# Patient Record
Sex: FEMALE | Race: WHITE | NOT HISPANIC OR LATINO | Employment: OTHER | URBAN - METROPOLITAN AREA
[De-identification: names, ages, dates, MRNs, and addresses within clinical notes are randomized per-mention and may not be internally consistent; named-entity substitution may affect disease eponyms.]

---

## 2017-01-03 ENCOUNTER — RESULTS ENCOUNTER (OUTPATIENT)
Dept: ENDOCRINOLOGY | Age: 72
End: 2017-01-03

## 2017-01-03 DIAGNOSIS — E79.0 HYPERURICEMIA: ICD-10-CM

## 2017-01-03 DIAGNOSIS — E55.9 VITAMIN D DEFICIENCY: ICD-10-CM

## 2017-01-03 DIAGNOSIS — IMO0002 UNCONTROLLED TYPE 2 DIABETES MELLITUS: ICD-10-CM

## 2017-01-03 DIAGNOSIS — I10 ESSENTIAL HYPERTENSION: ICD-10-CM

## 2017-01-03 DIAGNOSIS — E78.5 DYSLIPIDEMIA: ICD-10-CM

## 2017-01-16 ENCOUNTER — LAB (OUTPATIENT)
Dept: ENDOCRINOLOGY | Age: 72
End: 2017-01-16

## 2017-01-16 DIAGNOSIS — E79.0 HYPERURICEMIA: ICD-10-CM

## 2017-01-16 DIAGNOSIS — E78.5 DYSLIPIDEMIA: ICD-10-CM

## 2017-01-16 DIAGNOSIS — IMO0002 UNCONTROLLED TYPE 2 DIABETES MELLITUS: ICD-10-CM

## 2017-01-16 DIAGNOSIS — E55.9 VITAMIN D DEFICIENCY: ICD-10-CM

## 2017-01-16 DIAGNOSIS — I10 ESSENTIAL HYPERTENSION: ICD-10-CM

## 2017-01-17 LAB
25(OH)D3+25(OH)D2 SERPL-MCNC: 47.2 NG/ML (ref 30–100)
ALBUMIN SERPL-MCNC: 4 G/DL (ref 3.5–5.2)
ALBUMIN/GLOB SERPL: 1.6 G/DL
ALP SERPL-CCNC: 77 U/L (ref 39–117)
ALT SERPL-CCNC: 14 U/L (ref 1–33)
AST SERPL-CCNC: 22 U/L (ref 1–32)
BILIRUB SERPL-MCNC: 0.7 MG/DL (ref 0.1–1.2)
BUN SERPL-MCNC: 21 MG/DL (ref 8–23)
BUN/CREAT SERPL: 14.6 (ref 7–25)
C PEPTIDE SERPL-MCNC: 1.7 NG/ML (ref 1.1–4.4)
CALCIUM SERPL-MCNC: 9.5 MG/DL (ref 8.6–10.5)
CHLORIDE SERPL-SCNC: 102 MMOL/L (ref 98–107)
CHOLEST SERPL-MCNC: 181 MG/DL (ref 0–200)
CO2 SERPL-SCNC: 30.3 MMOL/L (ref 22–29)
CREAT SERPL-MCNC: 1.44 MG/DL (ref 0.57–1)
GLOBULIN SER CALC-MCNC: 2.5 GM/DL
GLUCOSE SERPL-MCNC: 85 MG/DL (ref 65–99)
HBA1C MFR BLD: 6.8 % (ref 4.8–5.6)
HDLC SERPL-MCNC: 52 MG/DL (ref 40–60)
LDLC SERPL CALC-MCNC: 101 MG/DL (ref 0–100)
MICROALBUMIN UR-MCNC: 20 UG/ML
POTASSIUM SERPL-SCNC: 5.2 MMOL/L (ref 3.5–5.2)
PROT SERPL-MCNC: 6.5 G/DL (ref 6–8.5)
SODIUM SERPL-SCNC: 145 MMOL/L (ref 136–145)
T4 SERPL-MCNC: 7.4 MCG/DL (ref 4.5–11.7)
TRIGL SERPL-MCNC: 138 MG/DL (ref 0–150)
TSH SERPL DL<=0.005 MIU/L-ACNC: 3.49 MIU/ML (ref 0.27–4.2)
URATE SERPL-MCNC: 1.8 MG/DL (ref 2.4–5.7)
VLDLC SERPL CALC-MCNC: 27.6 MG/DL (ref 5–40)

## 2017-01-23 RX ORDER — FEBUXOSTAT 80 MG/1
TABLET ORAL
Qty: 90 TABLET | Refills: 0 | Status: SHIPPED | OUTPATIENT
Start: 2017-01-23 | End: 2017-01-30 | Stop reason: SDUPTHER

## 2017-01-30 ENCOUNTER — OFFICE VISIT (OUTPATIENT)
Dept: ENDOCRINOLOGY | Age: 72
End: 2017-01-30

## 2017-01-30 VITALS
HEIGHT: 64 IN | SYSTOLIC BLOOD PRESSURE: 130 MMHG | WEIGHT: 228.2 LBS | BODY MASS INDEX: 38.96 KG/M2 | DIASTOLIC BLOOD PRESSURE: 80 MMHG

## 2017-01-30 DIAGNOSIS — E55.9 VITAMIN D DEFICIENCY: ICD-10-CM

## 2017-01-30 DIAGNOSIS — E66.9 CLASS 1 OBESITY: ICD-10-CM

## 2017-01-30 DIAGNOSIS — E11.9 TYPE 2 DIABETES MELLITUS WITHOUT COMPLICATION, UNSPECIFIED LONG TERM INSULIN USE STATUS: Primary | ICD-10-CM

## 2017-01-30 DIAGNOSIS — N28.9 RENAL INSUFFICIENCY: ICD-10-CM

## 2017-01-30 DIAGNOSIS — E78.5 DYSLIPIDEMIA: ICD-10-CM

## 2017-01-30 DIAGNOSIS — I10 ESSENTIAL HYPERTENSION: ICD-10-CM

## 2017-01-30 PROBLEM — N18.30 CHRONIC KIDNEY DISEASE, STAGE III (MODERATE): Status: ACTIVE | Noted: 2017-01-30

## 2017-01-30 PROCEDURE — 99214 OFFICE O/P EST MOD 30 MIN: CPT | Performed by: NURSE PRACTITIONER

## 2017-01-30 RX ORDER — SITAGLIPTIN 50 MG/1
50 TABLET, FILM COATED ORAL DAILY
Qty: 30 TABLET | Refills: 5
Start: 2017-01-30 | End: 2017-02-16 | Stop reason: SDUPTHER

## 2017-01-30 NOTE — PROGRESS NOTES
"Subjective   Rachel Albrecht is a 72 y.o. female is here today for follow-up.  Chief Complaint   Patient presents with   • Diabetes     recent labs, BG log book, testing BG one time daily   • Hypertension     patient is having a hard time losing weight   • hyperuricemia   • Vitamin D Deficiency     Visit Vitals   • /80   • Ht 64\" (162.6 cm)   • Wt 228 lb 3.2 oz (104 kg)   • BMI 39.17 kg/m2       Current Outpatient Prescriptions:   •  B-D ULTRAFINE III SHORT PEN 31G X 8 MM misc, USE TO INJECT INSULIN DAILY, Disp: 100 each, Rfl: 3  •  Cholecalciferol (VITAMIN D3) 2000 UNITS tablet, Take 2 tablets by mouth daily., Disp: , Rfl:   •  febuxostat (ULORIC) 80 MG tablet tablet, Take 80 mg by mouth daily., Disp: , Rfl:   •  JANUVIA 50 MG tablet, Take 50 mg by mouth daily., Disp: , Rfl:   •  LEVEMIR FLEXPEN 100 UNIT/ML injection, Inject 60 Units under the skin daily., Disp: , Rfl:   •  metoprolol tartrate (LOPRESSOR) 50 MG tablet, TAKE 1 TABLET DAILY, Disp: 90 tablet, Rfl: 2  •  Multiple Vitamins-Minerals (PRESERVISION AREDS PO), Take 1 tablet by mouth daily., Disp: , Rfl:   •  ONE TOUCH ULTRA TEST test strip, TEST BLOOD SUGAR TWO TIMES A DAY, Disp: 200 each, Rfl: 0  •  pioglitazone (ACTOS) 45 MG tablet, Take 1 tablet by mouth daily., Disp: 90 tablet, Rfl: 3  •  simvastatin (ZOCOR) 20 MG tablet, TAKE 1 TABLET DAILY, Disp: 90 tablet, Rfl: 2  No Known Allergies  Social History     Social History   • Marital status:      Spouse name: N/A   • Number of children: N/A   • Years of education: N/A     Social History Main Topics   • Smoking status: Never Smoker   • Smokeless tobacco: None   • Alcohol use No   • Drug use: None   • Sexual activity: Not Asked     Other Topics Concern   • None     Social History Narrative     Family History   Problem Relation Age of Onset   • Diabetes Father    • Kidney disease Father    • Heart attack Father    • Diabetes Brother    • Kidney disease Brother      No Known " "Allergies      History of Present Illness   Encounter Diagnoses   Name Primary?   • Essential hypertension    • Vitamin D deficiency    • Class 1 obesity    • Type 2 diabetes mellitus without complication, unspecified long term insulin use status Yes   • Renal insufficiency    • Dyslipidemia        Patient is a 72-year-old white female who is seen today for a four-month follow-up above-mentioned problems.  Recent labs which were reviewed and discussed with patient she was provided a copy.  Patient states she takes all medications as prescribed. Brought in blood sugar log which was reviewed with blood sugars averaging between 90s-130s.  According to her most recent A1c her diabetes is well controlled. She states she has had occasional episodes of low blood sugar in which she feels sweaty and \"can't think\" however, she does not check her blood sugar when these episodes occur. Most Recent episode occurred last week when she hadn't eaten in a while and also states that low blood sugar signs and symptoms only occur when she hasn't eaten in a while.  She states that when she eats symptoms improve. Patient states she is trying to lose weight and wants to discuss dieting.  She states she has lost 4 pounds but unfortunately has gained it back. She is not currently exercising but has used weight watchers in the past with good results and is considering going back. We discussed the possibility of adding GLP-1 to current medications and stopping januvia, however she refused any med changes at this time, as she feels she is doing well and prefers to try dieting first.  Her recent labs her vitamin D level and cholesterol is within normal limits.  Her blood pressure is stable at today's visit.    The following portions of the patient's history were reviewed and updated as appropriate: allergies, current medications, past family history, past medical history, past social history, past surgical history and problem list.    Review of " Systems   Constitutional: Negative for appetite change.   HENT: Negative for facial swelling.    Eyes: Negative for pain.   Respiratory: Negative for cough.    Cardiovascular: Negative for leg swelling.   Gastrointestinal: Negative for anal bleeding.   Endocrine: Negative for heat intolerance.   Genitourinary: Negative for menstrual problem.   Musculoskeletal: Negative for joint swelling.   Skin: Negative for color change.   Allergic/Immunologic: Negative for food allergies.   Neurological: Negative for seizures.   Hematological: Does not bruise/bleed easily.   Psychiatric/Behavioral: Negative for hallucinations.       Objective   Physical Exam   Constitutional: She is oriented to person, place, and time. She appears well-developed and well-nourished. No distress.   HENT:   Head: Normocephalic and atraumatic.   Right Ear: External ear normal.   Left Ear: External ear normal.   Nose: Nose normal.   Mouth/Throat: Oropharynx is clear and moist. No oropharyngeal exudate.   Eyes: EOM are normal. Pupils are equal, round, and reactive to light. Right eye exhibits no discharge. Left eye exhibits no discharge.   Neck: Trachea normal, normal range of motion and full passive range of motion without pain. Neck supple. No tracheal tenderness present. Carotid bruit is not present. No tracheal deviation, no edema and no erythema present. No thyroid mass and no thyromegaly present.   Cardiovascular: Normal rate, regular rhythm, normal heart sounds and intact distal pulses.  Exam reveals no gallop and no friction rub.    No murmur heard.  Pulmonary/Chest: Effort normal and breath sounds normal. No stridor. No respiratory distress. She has no wheezes. She has no rales.   Abdominal: Soft. Bowel sounds are normal. She exhibits no distension.   Musculoskeletal: Normal range of motion. She exhibits edema. She exhibits no deformity.   1 + pitting BLE   Lymphadenopathy:     She has no cervical adenopathy.   Neurological: She is alert and  oriented to person, place, and time.   Skin: Skin is warm and dry. No rash noted. She is not diaphoretic. No erythema. No pallor.   Psychiatric: She has a normal mood and affect. Her behavior is normal. Judgment and thought content normal.   Nursing note and vitals reviewed.    Results for orders placed or performed in visit on 01/03/17   T4 and TSH ([facility] Only)   Result Value Ref Range    TSH 3.490 0.270 - 4.200 mIU/mL    T4, Total 7.40 4.50 - 11.70 mcg/dL   Comprehensive metabolic panel   Result Value Ref Range    Glucose 85 65 - 99 mg/dL    BUN 21 8 - 23 mg/dL    Creatinine 1.44 (H) 0.57 - 1.00 mg/dL    eGFR Non African Am 36 (L) >60 mL/min/1.73    eGFR African Am 43 (L) >60 mL/min/1.73    BUN/Creatinine Ratio 14.6 7.0 - 25.0    Sodium 145 136 - 145 mmol/L    Potassium 5.2 3.5 - 5.2 mmol/L    Chloride 102 98 - 107 mmol/L    Total CO2 30.3 (H) 22.0 - 29.0 mmol/L    Calcium 9.5 8.6 - 10.5 mg/dL    Total Protein 6.5 6.0 - 8.5 g/dL    Albumin 4.00 3.50 - 5.20 g/dL    Globulin 2.5 gm/dL    A/G Ratio 1.6 g/dL    Total Bilirubin 0.7 0.1 - 1.2 mg/dL    Alkaline Phosphatase 77 39 - 117 U/L    AST (SGOT) 22 1 - 32 U/L    ALT (SGPT) 14 1 - 33 U/L   C-peptide   Result Value Ref Range    C-Peptide 1.7 1.1 - 4.4 ng/mL   Hemoglobin A1c   Result Value Ref Range    Hemoglobin A1C 6.80 (H) 4.80 - 5.60 %   Lipid panel   Result Value Ref Range    Total Cholesterol 181 0 - 200 mg/dL    Triglycerides 138 0 - 150 mg/dL    HDL Cholesterol 52 40 - 60 mg/dL    VLDL Cholesterol 27.6 5 - 40 mg/dL    LDL Cholesterol  101 (H) 0 - 100 mg/dL   Vitamin D 25 hydroxy   Result Value Ref Range    25 Hydroxy, Vitamin D 47.2 30.0 - 100.0 ng/mL   Uric acid   Result Value Ref Range    Uric Acid 1.8 (L) 2.4 - 5.7 mg/dL   MicroAlbumin, Urine, Random   Result Value Ref Range    Microalbumin, Urine 20.0 Not Estab. ug/mL           Assessment/Plan   Rachel was seen today for diabetes, hypertension, hyperuricemia and vitamin d deficiency.    Diagnoses and all  orders for this visit:    Type 2 diabetes mellitus without complication, unspecified long term insulin use status    Essential hypertension    Vitamin D deficiency    Class 1 obesity    Renal insufficiency    Dyslipidemia    In summary, the patient was examined and given a hard copy and all laboratory results.  No changes in current medications. She will continue all her current meds.  Patient is encouraged to begin weight watchers in order to help promote weight loss.  Symptoms of low blood sugar were reviewed with patient she states understanding.  She was advised that if she feels like she is having a low blood sugar to check her blood sugar.  I have instructed patient to call office with any questions or concerns and to follow-up in 4 months with labs prior to appointment.

## 2017-01-30 NOTE — MR AVS SNAPSHOT
Rachel Albrecht   1/30/2017 1:40 PM   Office Visit    Dept Phone:  [phone]   Encounter #:  [id number]    Provider:  ALECIA Sims   Department:  [facility] ENDO[facility]OLOG[facility]                Your Full Care Plan              Today's Medication Changes          These changes are accurate as of: 1/30/17  2:24 PM.  If you have any questions, ask your nurse or doctor.               Medication(s)that have changed:     febuxostat 80 MG tablet tablet   Commonly known as:  ULORIC   Take 80 mg by mouth daily.   What changed:  Another medication with the same name was removed. Continue taking this medication, and follow the directions you see here.   Changed by:  ALECIA Sims            Where to Get Your Medications      Information about where to get these medications is not yet available     ! Ask your nurse or doctor about these medications     JANUVIA 50 MG tablet                  Your Updated Medication List          This list is accurate as of: 1/30/17  2:24 PM.  Always use your most recent med list.                B-D ULTRAFINE III SHORT PEN 31G X 8 MM misc   Generic drug:  Insulin Pen Needle   USE TO INJECT INSULIN DAILY       febuxostat 80 MG tablet tablet   Commonly known as:  ULORIC       JANUVIA 50 MG tablet   Generic drug:  SITagliptin   Take 1 tablet by mouth Daily.       LEVEMIR FLEXPEN 100 UNIT/ML injection   Generic drug:  insulin detemir       metoprolol tartrate 50 MG tablet   Commonly known as:  LOPRESSOR   TAKE 1 TABLET DAILY       ONE TOUCH ULTRA TEST test strip   Generic drug:  glucose blood   TEST BLOOD SUGAR TWO TIMES A DAY       pioglitazone 45 MG tablet   Commonly known as:  ACTOS   Take 1 tablet by mouth daily.       PRESERVISION AREDS PO       simvastatin 20 MG tablet   Commonly known as:  ZOCOR   TAKE 1 TABLET DAILY       Vitamin D3 2000 UNITS tablet               You Were Diagnosed With        Codes Comments    Type 2 diabetes mellitus  without complication, unspecified long term insulin use status    -  Primary ICD-10-CM: E11.9  ICD-9-CM: 250.00     Essential hypertension     ICD-10-CM: I10  ICD-9-CM: 401.9     Vitamin D deficiency     ICD-10-CM: E55.9  ICD-9-CM: 268.9     Class 1 obesity     ICD-10-CM: E66.9  ICD-9-CM: 278.00     Renal insufficiency     ICD-10-CM: N28.9  ICD-9-CM: 593.9     Dyslipidemia     ICD-10-CM: E78.5  ICD-9-CM: 272.4       Instructions    Continue all current medications   Sign up to weight watchers     Patient Instructions History      Upcoming Appointments     Visit Type Date Time Department    OFFICE VISIT 2017  1:40 PM [facility]    [facility] 2017  1:00 PM [facility]    OFFICE VISIT 2017  1:40 PM [facility]      [employer] Signup     [facility] [employer] allows you to send messages to your doctor, view your test results, renew your prescriptions, schedule appointments, and more. To sign up, go to [employer] and click on the Sign Up Now link in the New User? box. Enter your [employer] Activation Code exactly as it appears below along with the last four digits of your Social Security Number and your Date of Birth () to complete the sign-up process. If you do not sign up before the expiration date, you must request a new code.    [employer] Activation Code: [email or web address]  Expires: 2017  2:22 PM    If you have questions, you can email [email or web address] or call [phone] to talk to our [employer] staff. Remember, [employer] is NOT to be used for urgent needs. For medical emergencies, dial 911.               Other Info from Your Visit           Your Appointments     May 16, 2017  1:00 PM EDT   [facility], [facility]   [facility] (--)    [address] [facility]. [address] [place] [id number]   [phone]            May 30, 2017  1:40 PM EDT   Office Visit with Marilin Bird MD   [facility]  "ENDOCRINOLOGY (--)    [address] [facility]. [address] [place] [id number] [phone]           Arrive 15 minutes prior to appointment.              Allergies     No Known Allergies      Reason for Visit     Diabetes recent labs, BG log book, testing BG one time daily    Hypertension patient is having a hard time losing weight    hyperuricemia     Vitamin D Deficiency           Vital Signs     Blood Pressure Height Weight Body Mass Index Smoking Status       130/80 64\" (162.6 cm) 228 lb 3.2 oz (104 kg) 39.17 kg/m2 Never Smoker       Problems and Diagnoses Noted     Chronic kidney disease, stage III (moderate)    Obesity    Dyslipidemia    High blood pressure    Elevated blood uric acid level    Knee pain    Macular degeneration    Degenerative arthritis of knee    Bone disease    Pulmonary hypertension    Poor kidney function    Sleep apnea    Type 2 diabetes    Vitamin D deficiency        "

## 2017-02-16 ENCOUNTER — TELEPHONE (OUTPATIENT)
Dept: ENDOCRINOLOGY | Age: 72
End: 2017-02-16

## 2017-02-16 DIAGNOSIS — E78.5 DYSLIPIDEMIA: ICD-10-CM

## 2017-02-16 DIAGNOSIS — E55.9 VITAMIN D DEFICIENCY: ICD-10-CM

## 2017-02-16 DIAGNOSIS — N28.9 RENAL INSUFFICIENCY: ICD-10-CM

## 2017-02-16 DIAGNOSIS — IMO0002 UNCONTROLLED TYPE 2 DIABETES MELLITUS: ICD-10-CM

## 2017-02-16 RX ORDER — INSULIN DETEMIR 100 [IU]/ML
INJECTION, SOLUTION SUBCUTANEOUS
Qty: 60 ML | Refills: 2 | Status: SHIPPED | OUTPATIENT
Start: 2017-02-16 | End: 2017-12-18 | Stop reason: SDUPTHER

## 2017-02-16 RX ORDER — SIMVASTATIN 20 MG
TABLET ORAL
Qty: 90 TABLET | Refills: 1 | Status: SHIPPED | OUTPATIENT
Start: 2017-02-16 | End: 2017-08-22 | Stop reason: SDUPTHER

## 2017-02-16 RX ORDER — SITAGLIPTIN 50 MG/1
TABLET, FILM COATED ORAL
Qty: 90 TABLET | Refills: 2 | Status: SHIPPED | OUTPATIENT
Start: 2017-02-16 | End: 2017-11-21 | Stop reason: SDUPTHER

## 2017-02-16 NOTE — TELEPHONE ENCOUNTER
I left patient a message for her to call back pertaining to her BG readings.  Komal states that BG is in good range. She advised pt to cut back to 50 units of Lantus if she becomes symptomatic at BG reading of 111

## 2017-03-28 DIAGNOSIS — E55.9 VITAMIN D DEFICIENCY: ICD-10-CM

## 2017-03-28 DIAGNOSIS — N28.9 RENAL INSUFFICIENCY: ICD-10-CM

## 2017-03-28 DIAGNOSIS — E78.5 DYSLIPIDEMIA: ICD-10-CM

## 2017-03-28 DIAGNOSIS — IMO0002 UNCONTROLLED TYPE 2 DIABETES MELLITUS: ICD-10-CM

## 2017-03-28 RX ORDER — PIOGLITAZONEHYDROCHLORIDE 45 MG/1
TABLET ORAL
Qty: 90 TABLET | Refills: 2 | Status: SHIPPED | OUTPATIENT
Start: 2017-03-28 | End: 2017-12-29 | Stop reason: SDUPTHER

## 2017-03-28 RX ORDER — METOPROLOL TARTRATE 50 MG/1
TABLET, FILM COATED ORAL
Qty: 90 TABLET | Refills: 1 | Status: SHIPPED | OUTPATIENT
Start: 2017-03-28 | End: 2017-10-09 | Stop reason: SDUPTHER

## 2017-04-24 DIAGNOSIS — N28.9 RENAL INSUFFICIENCY: ICD-10-CM

## 2017-04-24 DIAGNOSIS — IMO0002 UNCONTROLLED TYPE 2 DIABETES MELLITUS: ICD-10-CM

## 2017-04-24 DIAGNOSIS — E55.9 VITAMIN D DEFICIENCY: ICD-10-CM

## 2017-04-24 DIAGNOSIS — E78.5 DYSLIPIDEMIA: ICD-10-CM

## 2017-04-24 RX ORDER — FEBUXOSTAT 80 MG/1
TABLET ORAL
Qty: 90 TABLET | Refills: 1 | Status: SHIPPED | OUTPATIENT
Start: 2017-04-24 | End: 2017-10-25 | Stop reason: SDUPTHER

## 2017-05-15 DIAGNOSIS — E79.0 HYPERURICEMIA: ICD-10-CM

## 2017-05-15 DIAGNOSIS — IMO0002 UNCONTROLLED TYPE 2 DIABETES MELLITUS WITH OTHER SPECIFIED COMPLICATION: Primary | ICD-10-CM

## 2017-05-15 DIAGNOSIS — E55.9 VITAMIN D DEFICIENCY: Primary | ICD-10-CM

## 2017-05-15 DIAGNOSIS — E78.5 DYSLIPIDEMIA: ICD-10-CM

## 2017-05-16 ENCOUNTER — LAB (OUTPATIENT)
Dept: ENDOCRINOLOGY | Age: 72
End: 2017-05-16

## 2017-05-16 DIAGNOSIS — E55.9 VITAMIN D DEFICIENCY: ICD-10-CM

## 2017-05-16 DIAGNOSIS — E79.0 HYPERURICEMIA: ICD-10-CM

## 2017-05-16 DIAGNOSIS — IMO0002 UNCONTROLLED TYPE 2 DIABETES MELLITUS WITH OTHER SPECIFIED COMPLICATION: ICD-10-CM

## 2017-05-16 DIAGNOSIS — E78.5 DYSLIPIDEMIA: ICD-10-CM

## 2017-05-17 LAB
25(OH)D3+25(OH)D2 SERPL-MCNC: 50.3 NG/ML (ref 30–100)
ALBUMIN SERPL-MCNC: 3.9 G/DL (ref 3.5–5.2)
ALBUMIN/GLOB SERPL: 1.5 G/DL
ALP SERPL-CCNC: 73 U/L (ref 39–117)
ALT SERPL-CCNC: 12 U/L (ref 1–33)
AST SERPL-CCNC: 21 U/L (ref 1–32)
BILIRUB SERPL-MCNC: 0.6 MG/DL (ref 0.1–1.2)
BUN SERPL-MCNC: 27 MG/DL (ref 8–23)
BUN/CREAT SERPL: 17.5 (ref 7–25)
C PEPTIDE SERPL-MCNC: 3.5 NG/ML (ref 1.1–4.4)
CALCIUM SERPL-MCNC: 9.2 MG/DL (ref 8.6–10.5)
CHLORIDE SERPL-SCNC: 100 MMOL/L (ref 98–107)
CHOLEST SERPL-MCNC: 194 MG/DL (ref 0–200)
CO2 SERPL-SCNC: 29 MMOL/L (ref 22–29)
CREAT SERPL-MCNC: 1.54 MG/DL (ref 0.57–1)
GLOBULIN SER CALC-MCNC: 2.6 GM/DL
GLUCOSE SERPL-MCNC: 203 MG/DL (ref 65–99)
HBA1C MFR BLD: 7.2 % (ref 4.8–5.6)
HDLC SERPL-MCNC: 48 MG/DL (ref 40–60)
LDLC SERPL CALC-MCNC: 120 MG/DL (ref 0–100)
POTASSIUM SERPL-SCNC: 4.6 MMOL/L (ref 3.5–5.2)
PROT SERPL-MCNC: 6.5 G/DL (ref 6–8.5)
SODIUM SERPL-SCNC: 140 MMOL/L (ref 136–145)
T4 SERPL-MCNC: 6.56 MCG/DL (ref 4.5–11.7)
TRIGL SERPL-MCNC: 132 MG/DL (ref 0–150)
TSH SERPL DL<=0.005 MIU/L-ACNC: 3.41 MIU/ML (ref 0.27–4.2)
UNABLE TO VOID: NORMAL
URATE SERPL-MCNC: 1.7 MG/DL (ref 2.4–5.7)
VLDLC SERPL CALC-MCNC: 26.4 MG/DL (ref 5–40)

## 2017-05-30 ENCOUNTER — OFFICE VISIT (OUTPATIENT)
Dept: ENDOCRINOLOGY | Age: 72
End: 2017-05-30

## 2017-05-30 VITALS
BODY MASS INDEX: 38.93 KG/M2 | SYSTOLIC BLOOD PRESSURE: 130 MMHG | RESPIRATION RATE: 16 BRPM | HEIGHT: 64 IN | DIASTOLIC BLOOD PRESSURE: 66 MMHG | WEIGHT: 228 LBS

## 2017-05-30 DIAGNOSIS — IMO0002 UNCONTROLLED TYPE 2 DIABETES MELLITUS WITH OTHER SPECIFIED COMPLICATION: Primary | ICD-10-CM

## 2017-05-30 DIAGNOSIS — I10 ESSENTIAL HYPERTENSION: ICD-10-CM

## 2017-05-30 DIAGNOSIS — E55.9 VITAMIN D DEFICIENCY: ICD-10-CM

## 2017-05-30 DIAGNOSIS — E78.5 DYSLIPIDEMIA: ICD-10-CM

## 2017-05-30 DIAGNOSIS — N28.9 RENAL INSUFFICIENCY: ICD-10-CM

## 2017-05-30 DIAGNOSIS — E79.0 HYPERURICEMIA: ICD-10-CM

## 2017-05-30 PROCEDURE — 99214 OFFICE O/P EST MOD 30 MIN: CPT | Performed by: INTERNAL MEDICINE

## 2017-08-09 RX ORDER — BLOOD-GLUCOSE METER
KIT MISCELLANEOUS
Qty: 1 EACH | Refills: 1 | Status: SHIPPED | OUTPATIENT
Start: 2017-08-09 | End: 2019-04-08 | Stop reason: SDUPTHER

## 2017-08-10 ENCOUNTER — TELEPHONE (OUTPATIENT)
Dept: ENDOCRINOLOGY | Age: 72
End: 2017-08-10

## 2017-08-10 NOTE — TELEPHONE ENCOUNTER
----- Message from Marilin Bird MD sent at 8/9/2017  4:53 PM EDT -----  She can eat cereal, oatmeal and intake and little more of what ever she is eating such as more pieces of toast with butter and used some fruit preserve.  ----- Message -----     From: Lyssa Godoy MA     Sent: 8/9/2017   4:26 PM       To: Marilin Bird MD    She eats toast with peanut butter and yogurt. She would like to know what else she can eat in the mornings to help.   ----- Message -----     From: Marilin Bird MD     Sent: 8/8/2017   5:20 PM       To: Lyssa Godoy MA    Orders she may need to have bigger breakfast.  Because this phenomenon does not happen every day.  Also its a good idea to give her a new glucose meter.  Ask her to stop by to get a new one  ----- Message -----     From: Lyssa Godoy MA     Sent: 8/8/2017  11:56 AM       To: Marilin Bird MD    Patient had hypoglycemic episode on Saturday. She took BG at 630AM and it was 146. She had breakfast but by 10AM she passed out on the floor x 6 hours with low blood sugar. She does not know if her meter is reading wrong or if her medications need to be adjusted.         Spoke to patient 08/10/2017 11:55AM

## 2017-08-22 RX ORDER — SIMVASTATIN 20 MG
TABLET ORAL
Qty: 90 TABLET | Refills: 0 | Status: CANCELLED | OUTPATIENT
Start: 2017-08-22

## 2017-08-22 RX ORDER — SIMVASTATIN 20 MG
20 TABLET ORAL NIGHTLY
Qty: 90 TABLET | Refills: 0 | Status: SHIPPED | OUTPATIENT
Start: 2017-08-22 | End: 2017-11-21 | Stop reason: SDUPTHER

## 2017-09-20 ENCOUNTER — RESULTS ENCOUNTER (OUTPATIENT)
Dept: ENDOCRINOLOGY | Age: 72
End: 2017-09-20

## 2017-09-20 DIAGNOSIS — E78.5 DYSLIPIDEMIA: ICD-10-CM

## 2017-09-20 DIAGNOSIS — IMO0002 UNCONTROLLED TYPE 2 DIABETES MELLITUS WITH OTHER SPECIFIED COMPLICATION: ICD-10-CM

## 2017-09-20 DIAGNOSIS — E55.9 VITAMIN D DEFICIENCY: ICD-10-CM

## 2017-09-20 DIAGNOSIS — I10 ESSENTIAL HYPERTENSION: ICD-10-CM

## 2017-09-20 DIAGNOSIS — E79.0 HYPERURICEMIA: ICD-10-CM

## 2017-09-26 DIAGNOSIS — E11.9 TYPE 2 DIABETES MELLITUS WITHOUT COMPLICATION, UNSPECIFIED LONG TERM INSULIN USE STATUS: Primary | ICD-10-CM

## 2017-09-26 DIAGNOSIS — E79.0 HYPERURICEMIA: ICD-10-CM

## 2017-09-26 DIAGNOSIS — E55.9 VITAMIN D DEFICIENCY: ICD-10-CM

## 2017-10-03 ENCOUNTER — LAB (OUTPATIENT)
Dept: ENDOCRINOLOGY | Age: 72
End: 2017-10-03

## 2017-10-03 DIAGNOSIS — IMO0002 UNCONTROLLED TYPE 2 DIABETES MELLITUS WITH OTHER SPECIFIED COMPLICATION: ICD-10-CM

## 2017-10-03 DIAGNOSIS — E55.9 VITAMIN D DEFICIENCY: ICD-10-CM

## 2017-10-03 DIAGNOSIS — E79.0 HYPERURICEMIA: ICD-10-CM

## 2017-10-03 DIAGNOSIS — E78.5 DYSLIPIDEMIA: ICD-10-CM

## 2017-10-03 DIAGNOSIS — E11.9 TYPE 2 DIABETES MELLITUS WITHOUT COMPLICATION, UNSPECIFIED LONG TERM INSULIN USE STATUS: ICD-10-CM

## 2017-10-03 DIAGNOSIS — I10 ESSENTIAL HYPERTENSION: ICD-10-CM

## 2017-10-04 LAB
25(OH)D3+25(OH)D2 SERPL-MCNC: 57.4 NG/ML (ref 30–100)
ALBUMIN SERPL-MCNC: 3.9 G/DL (ref 3.5–5.2)
ALBUMIN/GLOB SERPL: 1.4 G/DL
ALP SERPL-CCNC: 73 U/L (ref 39–117)
ALT SERPL-CCNC: 12 U/L (ref 1–33)
AST SERPL-CCNC: 21 U/L (ref 1–32)
BILIRUB SERPL-MCNC: 0.8 MG/DL (ref 0.1–1.2)
BUN SERPL-MCNC: 25 MG/DL (ref 8–23)
BUN/CREAT SERPL: 16.1 (ref 7–25)
C PEPTIDE SERPL-MCNC: 3 NG/ML (ref 1.1–4.4)
CALCIUM SERPL-MCNC: 9.7 MG/DL (ref 8.6–10.5)
CHLORIDE SERPL-SCNC: 102 MMOL/L (ref 98–107)
CHOLEST SERPL-MCNC: 177 MG/DL (ref 0–200)
CO2 SERPL-SCNC: 27.2 MMOL/L (ref 22–29)
CREAT SERPL-MCNC: 1.55 MG/DL (ref 0.57–1)
GLOBULIN SER CALC-MCNC: 2.8 GM/DL
GLUCOSE SERPL-MCNC: 160 MG/DL (ref 65–99)
HBA1C MFR BLD: 6.78 % (ref 4.8–5.6)
HDLC SERPL-MCNC: 48 MG/DL (ref 40–60)
LDLC SERPL CALC-MCNC: 106 MG/DL (ref 0–100)
MICROALBUMIN UR-MCNC: 73.3 UG/ML
POTASSIUM SERPL-SCNC: 5.3 MMOL/L (ref 3.5–5.2)
PROT SERPL-MCNC: 6.7 G/DL (ref 6–8.5)
SODIUM SERPL-SCNC: 142 MMOL/L (ref 136–145)
TRIGL SERPL-MCNC: 114 MG/DL (ref 0–150)
URATE SERPL-MCNC: 1.8 MG/DL (ref 2.4–5.7)
VLDLC SERPL CALC-MCNC: 22.8 MG/DL (ref 5–40)

## 2017-10-07 DIAGNOSIS — E78.5 DYSLIPIDEMIA: ICD-10-CM

## 2017-10-07 DIAGNOSIS — IMO0002 UNCONTROLLED TYPE 2 DIABETES MELLITUS: ICD-10-CM

## 2017-10-07 DIAGNOSIS — N28.9 RENAL INSUFFICIENCY: ICD-10-CM

## 2017-10-07 DIAGNOSIS — E55.9 VITAMIN D DEFICIENCY: ICD-10-CM

## 2017-10-07 RX ORDER — METOPROLOL TARTRATE 50 MG/1
TABLET, FILM COATED ORAL
Qty: 90 TABLET | Refills: 1 | Status: CANCELLED | OUTPATIENT
Start: 2017-10-07

## 2017-10-09 DIAGNOSIS — N28.9 RENAL INSUFFICIENCY: ICD-10-CM

## 2017-10-09 DIAGNOSIS — IMO0001 UNCONTROLLED DIABETES MELLITUS TYPE 2 WITHOUT COMPLICATIONS, UNSPECIFIED LONG TERM INSULIN USE STATUS: ICD-10-CM

## 2017-10-09 DIAGNOSIS — E55.9 VITAMIN D DEFICIENCY: ICD-10-CM

## 2017-10-09 DIAGNOSIS — E78.5 DYSLIPIDEMIA: ICD-10-CM

## 2017-10-09 RX ORDER — METOPROLOL TARTRATE 50 MG/1
50 TABLET, FILM COATED ORAL DAILY
Qty: 90 TABLET | Refills: 0 | Status: SHIPPED | OUTPATIENT
Start: 2017-10-09 | End: 2018-01-05 | Stop reason: SDUPTHER

## 2017-10-17 ENCOUNTER — OFFICE VISIT (OUTPATIENT)
Dept: ENDOCRINOLOGY | Age: 72
End: 2017-10-17

## 2017-10-17 VITALS
SYSTOLIC BLOOD PRESSURE: 130 MMHG | WEIGHT: 229 LBS | DIASTOLIC BLOOD PRESSURE: 70 MMHG | HEIGHT: 64 IN | BODY MASS INDEX: 39.09 KG/M2

## 2017-10-17 DIAGNOSIS — E55.9 VITAMIN D DEFICIENCY: ICD-10-CM

## 2017-10-17 DIAGNOSIS — E79.0 HYPERURICEMIA: ICD-10-CM

## 2017-10-17 DIAGNOSIS — E78.5 DYSLIPIDEMIA: ICD-10-CM

## 2017-10-17 DIAGNOSIS — I10 ESSENTIAL HYPERTENSION: ICD-10-CM

## 2017-10-17 DIAGNOSIS — IMO0002 UNCONTROLLED TYPE 2 DIABETES MELLITUS WITH COMPLICATION, WITH LONG-TERM CURRENT USE OF INSULIN: Primary | ICD-10-CM

## 2017-10-17 PROCEDURE — 99214 OFFICE O/P EST MOD 30 MIN: CPT | Performed by: NURSE PRACTITIONER

## 2017-10-17 NOTE — PROGRESS NOTES
"Subjective   Rachel Albrecht is a 72 y.o. female is here today for follow-up.  Chief Complaint   Patient presents with   • Hyperlipidemia     recent labs, testing Bg 1 time daily and did not bring meter   • Hypertension   • Diabetes   • Vitamin D Deficiency   • hyperuricemia     /70  Ht 64\" (162.6 cm)  Wt 229 lb (104 kg)  BMI 39.31 kg/m2      History of Present Illness     This is a 72-year-old feel a patient here today for routine follow-up visit for type 2 diabetes, hypertension, dyslipidemia, vitamin D deficiency and hyperuricemia.  She's had recent labs which were reviewed and she was provided a copy.  She states her blood sugar this line was 87.  She did not bring her blood glucose meter in.  She sees a nephrologist for her renal insufficiency and she follows with Dr. Fitzpatrick.  She states she has had a history of a hypoglycemic event in which Dr. Bird told her to start eating more food in the mornings.  If her blood sugars less than 100 mg/dL she states she decreases her Levemir dose by 10 units.  Currently she is only taking 60 units.  She does not want to take an Ace or ARB.  She is currently on a beta blocker for blood pressure.  She does not exercise like she wants to but if she does she wants to walk or go to the gym.  She is cautious of hypoglycemic events and knows to monitor more closely if she does start to exercise.        The following portions of the patient's history were reviewed and updated as appropriate: allergies, current medications, past family history, past medical history, past social history, past surgical history and problem list.    Review of Systems   Constitutional: Negative for fatigue.   HENT: Negative for trouble swallowing.    Eyes: Negative for visual disturbance.   Respiratory: Negative for shortness of breath.    Cardiovascular: Negative for leg swelling.   Endocrine: Negative for polyuria.   Skin: Negative for wound.   Neurological: Negative for numbness. "       Objective   Physical Exam   Constitutional: She is oriented to person, place, and time. She appears well-developed and well-nourished. No distress.   Flat affect   HENT:   Head: Normocephalic and atraumatic.   Right Ear: External ear normal.   Left Ear: External ear normal.   Nose: Nose normal.   Mouth/Throat: Oropharynx is clear and moist. No oropharyngeal exudate.   Eyes: EOM are normal. Pupils are equal, round, and reactive to light. Right eye exhibits no discharge. Left eye exhibits no discharge.   Neck: Trachea normal, normal range of motion and full passive range of motion without pain. Neck supple. No tracheal tenderness present. Carotid bruit is not present. No tracheal deviation, no edema and no erythema present. No thyroid mass and no thyromegaly present.   Cardiovascular: Normal rate, regular rhythm, normal heart sounds and intact distal pulses.  Exam reveals no gallop and no friction rub.    No murmur heard.  Pulmonary/Chest: Effort normal and breath sounds normal. No stridor. No respiratory distress. She has no wheezes. She has no rales.   Abdominal: Soft. Bowel sounds are normal. She exhibits no distension.   Musculoskeletal: Normal range of motion. She exhibits edema. She exhibits no deformity.   2 + pitting BLE   Lymphadenopathy:     She has no cervical adenopathy.   Neurological: She is alert and oriented to person, place, and time.   Skin: Skin is warm and dry. No rash noted. She is not diaphoretic. No erythema. No pallor.   Psychiatric: She has a normal mood and affect. Her behavior is normal. Judgment and thought content normal.   Nursing note and vitals reviewed.      Family History   Problem Relation Age of Onset   • Diabetes Father    • Kidney disease Father    • Heart attack Father    • Diabetes Brother    • Kidney disease Brother      Social History   Substance Use Topics   • Smoking status: Never Smoker   • Smokeless tobacco: None   • Alcohol use No     No Known Allergies  Results for  orders placed or performed in visit on 10/03/17   Comprehensive Metabolic Panel   Result Value Ref Range    Glucose 160 (H) 65 - 99 mg/dL    BUN 25 (H) 8 - 23 mg/dL    Creatinine 1.55 (H) 0.57 - 1.00 mg/dL    eGFR Non African Am 33 (L) >60 mL/min/1.73    eGFR African Am 40 (L) >60 mL/min/1.73    BUN/Creatinine Ratio 16.1 7.0 - 25.0    Sodium 142 136 - 145 mmol/L    Potassium 5.3 (H) 3.5 - 5.2 mmol/L    Chloride 102 98 - 107 mmol/L    Total CO2 27.2 22.0 - 29.0 mmol/L    Calcium 9.7 8.6 - 10.5 mg/dL    Total Protein 6.7 6.0 - 8.5 g/dL    Albumin 3.90 3.50 - 5.20 g/dL    Globulin 2.8 gm/dL    A/G Ratio 1.4 g/dL    Total Bilirubin 0.8 0.1 - 1.2 mg/dL    Alkaline Phosphatase 73 39 - 117 U/L    AST (SGOT) 21 1 - 32 U/L    ALT (SGPT) 12 1 - 33 U/L   Lipid Panel   Result Value Ref Range    Total Cholesterol 177 0 - 200 mg/dL    Triglycerides 114 0 - 150 mg/dL    HDL Cholesterol 48 40 - 60 mg/dL    VLDL Cholesterol 22.8 5 - 40 mg/dL    LDL Cholesterol  106 (H) 0 - 100 mg/dL   Vitamin D 25 Hydroxy   Result Value Ref Range    25 Hydroxy, Vitamin D 57.4 30.0 - 100.0 ng/mL   Hemoglobin A1c   Result Value Ref Range    Hemoglobin A1C 6.78 (H) 4.80 - 5.60 %   C-Peptide   Result Value Ref Range    C-Peptide 3.0 1.1 - 4.4 ng/mL   Uric Acid   Result Value Ref Range    Uric Acid 1.8 (L) 2.4 - 5.7 mg/dL   MicroAlbumin, Urine, Random   Result Value Ref Range    Microalbumin, Urine 73.3 Not Estab. ug/mL       Assessment/Plan   Problems Addressed this Visit        Cardiovascular and Mediastinum    Hypertension       Digestive    Vitamin D deficiency       Endocrine    Uncontrolled type 2 diabetes mellitus - Primary       Other    Dyslipidemia    Hyperuricemia          In summary, patient was seen and examined.  Her blood pressure is stable.  Her hemoglobin A1c has improved.  She is adjusting her own insulin dose based on her blood sugars.  She will continue all her medications as prescribed.  A copy of her labs will be sent to her  nephrologist.  She denies any any refills on her medications at today's visit.  She is currently not on a Ace inhibitor.  Her renal insufficiency is stable.  She will follow-up with Dr. Bird her next visit with labs prior.  I've encouraged her to contact the office should she have any questions or concerns prior to then.  Metabolically she is stable at her A1c has improved.

## 2017-10-25 DIAGNOSIS — IMO0002 UNCONTROLLED TYPE 2 DIABETES MELLITUS: ICD-10-CM

## 2017-10-25 DIAGNOSIS — N28.9 RENAL INSUFFICIENCY: ICD-10-CM

## 2017-10-25 DIAGNOSIS — E78.5 DYSLIPIDEMIA: ICD-10-CM

## 2017-10-25 DIAGNOSIS — E55.9 VITAMIN D DEFICIENCY: ICD-10-CM

## 2017-10-25 RX ORDER — FEBUXOSTAT 80 MG/1
TABLET ORAL
Qty: 90 TABLET | Refills: 1 | Status: SHIPPED | OUTPATIENT
Start: 2017-10-25 | End: 2018-04-27 | Stop reason: SDUPTHER

## 2017-11-18 DIAGNOSIS — E78.5 DYSLIPIDEMIA: ICD-10-CM

## 2017-11-18 DIAGNOSIS — E55.9 VITAMIN D DEFICIENCY: ICD-10-CM

## 2017-11-18 DIAGNOSIS — N28.9 RENAL INSUFFICIENCY: ICD-10-CM

## 2017-11-18 DIAGNOSIS — IMO0002 UNCONTROLLED TYPE 2 DIABETES MELLITUS: ICD-10-CM

## 2017-11-20 RX ORDER — PEN NEEDLE, DIABETIC 31 GX5/16"
NEEDLE, DISPOSABLE MISCELLANEOUS
Qty: 100 EACH | Refills: 0 | Status: SHIPPED | OUTPATIENT
Start: 2017-11-20 | End: 2018-02-15 | Stop reason: SDUPTHER

## 2017-11-21 DIAGNOSIS — E55.9 VITAMIN D DEFICIENCY: ICD-10-CM

## 2017-11-21 DIAGNOSIS — E78.5 DYSLIPIDEMIA: ICD-10-CM

## 2017-11-21 DIAGNOSIS — N28.9 RENAL INSUFFICIENCY: ICD-10-CM

## 2017-11-21 RX ORDER — SIMVASTATIN 20 MG
TABLET ORAL
Qty: 90 TABLET | Refills: 0 | Status: SHIPPED | OUTPATIENT
Start: 2017-11-21 | End: 2018-02-20 | Stop reason: SDUPTHER

## 2017-11-21 RX ORDER — SITAGLIPTIN 50 MG/1
TABLET, FILM COATED ORAL
Qty: 90 TABLET | Refills: 2 | Status: SHIPPED | OUTPATIENT
Start: 2017-11-21 | End: 2018-08-23 | Stop reason: SDUPTHER

## 2017-12-18 DIAGNOSIS — E55.9 VITAMIN D DEFICIENCY: ICD-10-CM

## 2017-12-18 DIAGNOSIS — N28.9 RENAL INSUFFICIENCY: ICD-10-CM

## 2017-12-18 DIAGNOSIS — E78.5 DYSLIPIDEMIA: ICD-10-CM

## 2017-12-18 DIAGNOSIS — IMO0002 UNCONTROLLED TYPE 2 DIABETES MELLITUS: ICD-10-CM

## 2017-12-18 RX ORDER — INSULIN DETEMIR 100 [IU]/ML
INJECTION, SOLUTION SUBCUTANEOUS
Qty: 60 ML | Refills: 0 | Status: SHIPPED | OUTPATIENT
Start: 2017-12-18 | End: 2018-04-04 | Stop reason: SDUPTHER

## 2017-12-29 RX ORDER — PIOGLITAZONEHYDROCHLORIDE 45 MG/1
TABLET ORAL
Qty: 90 TABLET | Refills: 1 | Status: SHIPPED | OUTPATIENT
Start: 2017-12-29 | End: 2018-06-17 | Stop reason: SDUPTHER

## 2018-01-05 DIAGNOSIS — N28.9 RENAL INSUFFICIENCY: ICD-10-CM

## 2018-01-05 DIAGNOSIS — E78.5 DYSLIPIDEMIA: ICD-10-CM

## 2018-01-05 DIAGNOSIS — E55.9 VITAMIN D DEFICIENCY: ICD-10-CM

## 2018-01-05 DIAGNOSIS — IMO0001 UNCONTROLLED DIABETES MELLITUS TYPE 2 WITHOUT COMPLICATIONS, UNSPECIFIED LONG TERM INSULIN USE STATUS: ICD-10-CM

## 2018-01-05 RX ORDER — METOPROLOL TARTRATE 50 MG/1
TABLET, FILM COATED ORAL
Qty: 90 TABLET | Refills: 0 | Status: SHIPPED | OUTPATIENT
Start: 2018-01-05 | End: 2018-03-26 | Stop reason: SDUPTHER

## 2018-02-15 DIAGNOSIS — N28.9 RENAL INSUFFICIENCY: ICD-10-CM

## 2018-02-15 DIAGNOSIS — E55.9 VITAMIN D DEFICIENCY: ICD-10-CM

## 2018-02-15 DIAGNOSIS — IMO0002 UNCONTROLLED TYPE 2 DIABETES MELLITUS: ICD-10-CM

## 2018-02-15 DIAGNOSIS — E78.5 DYSLIPIDEMIA: ICD-10-CM

## 2018-02-16 RX ORDER — PEN NEEDLE, DIABETIC 31 GX5/16"
NEEDLE, DISPOSABLE MISCELLANEOUS
Qty: 100 EACH | Refills: 0 | Status: SHIPPED | OUTPATIENT
Start: 2018-02-16 | End: 2018-05-16 | Stop reason: SDUPTHER

## 2018-02-21 RX ORDER — SIMVASTATIN 20 MG
TABLET ORAL
Qty: 90 TABLET | Refills: 0 | Status: SHIPPED | OUTPATIENT
Start: 2018-02-21 | End: 2018-05-18 | Stop reason: SDUPTHER

## 2018-03-26 DIAGNOSIS — E55.9 VITAMIN D DEFICIENCY: ICD-10-CM

## 2018-03-26 DIAGNOSIS — IMO0001 UNCONTROLLED DIABETES MELLITUS TYPE 2 WITHOUT COMPLICATIONS, UNSPECIFIED LONG TERM INSULIN USE STATUS: ICD-10-CM

## 2018-03-26 DIAGNOSIS — N28.9 RENAL INSUFFICIENCY: ICD-10-CM

## 2018-03-26 DIAGNOSIS — E78.5 DYSLIPIDEMIA: ICD-10-CM

## 2018-03-28 RX ORDER — METOPROLOL TARTRATE 50 MG/1
TABLET, FILM COATED ORAL
Qty: 90 TABLET | Refills: 0 | Status: SHIPPED | OUTPATIENT
Start: 2018-03-28 | End: 2018-07-04 | Stop reason: SDUPTHER

## 2018-04-04 DIAGNOSIS — N28.9 RENAL INSUFFICIENCY: ICD-10-CM

## 2018-04-04 DIAGNOSIS — E55.9 VITAMIN D DEFICIENCY: ICD-10-CM

## 2018-04-04 DIAGNOSIS — IMO0002 UNCONTROLLED TYPE 2 DIABETES MELLITUS: ICD-10-CM

## 2018-04-04 DIAGNOSIS — E78.5 DYSLIPIDEMIA: ICD-10-CM

## 2018-04-04 RX ORDER — INSULIN DETEMIR 100 [IU]/ML
INJECTION, SOLUTION SUBCUTANEOUS
Qty: 60 ML | Refills: 0 | Status: SHIPPED | OUTPATIENT
Start: 2018-04-04 | End: 2018-05-18 | Stop reason: SDUPTHER

## 2018-04-18 LAB
25(OH)D3+25(OH)D2 SERPL-MCNC: 66.8 NG/ML (ref 30–100)
ALBUMIN SERPL-MCNC: 3.8 G/DL (ref 3.5–5.2)
ALBUMIN/GLOB SERPL: 1.4 G/DL
ALP SERPL-CCNC: 83 U/L (ref 39–117)
ALT SERPL-CCNC: 14 U/L (ref 1–33)
AST SERPL-CCNC: 19 U/L (ref 1–32)
BILIRUB SERPL-MCNC: 0.7 MG/DL (ref 0.1–1.2)
BUN SERPL-MCNC: 21 MG/DL (ref 8–23)
BUN/CREAT SERPL: 14.8 (ref 7–25)
C PEPTIDE SERPL-MCNC: 3.4 NG/ML (ref 1.1–4.4)
CALCIUM SERPL-MCNC: 9.2 MG/DL (ref 8.6–10.5)
CHLORIDE SERPL-SCNC: 102 MMOL/L (ref 98–107)
CHOLEST SERPL-MCNC: 170 MG/DL (ref 0–200)
CO2 SERPL-SCNC: 27.9 MMOL/L (ref 22–29)
CREAT SERPL-MCNC: 1.42 MG/DL (ref 0.57–1)
GFR SERPLBLD CREATININE-BSD FMLA CKD-EPI: 36 ML/MIN/1.73
GFR SERPLBLD CREATININE-BSD FMLA CKD-EPI: 44 ML/MIN/1.73
GLOBULIN SER CALC-MCNC: 2.7 GM/DL
GLUCOSE SERPL-MCNC: 191 MG/DL (ref 65–99)
HBA1C MFR BLD: 6.91 % (ref 4.8–5.6)
HDLC SERPL-MCNC: 46 MG/DL (ref 40–60)
INTERPRETATION: NORMAL
LDLC SERPL CALC-MCNC: 103 MG/DL (ref 0–100)
Lab: NORMAL
MICROALBUMIN UR-MCNC: 7.9 UG/ML
POTASSIUM SERPL-SCNC: 5.2 MMOL/L (ref 3.5–5.2)
PROT SERPL-MCNC: 6.5 G/DL (ref 6–8.5)
SODIUM SERPL-SCNC: 144 MMOL/L (ref 136–145)
T4 SERPL-MCNC: 6.69 MCG/DL (ref 4.5–11.7)
TRIGL SERPL-MCNC: 103 MG/DL (ref 0–150)
TSH SERPL DL<=0.005 MIU/L-ACNC: 4.38 MIU/ML (ref 0.27–4.2)
URATE SERPL-MCNC: 2 MG/DL (ref 2.4–5.7)
VLDLC SERPL CALC-MCNC: 20.6 MG/DL (ref 5–40)

## 2018-04-27 ENCOUNTER — TELEPHONE (OUTPATIENT)
Dept: ENDOCRINOLOGY | Age: 73
End: 2018-04-27

## 2018-04-27 DIAGNOSIS — E11.65 UNCONTROLLED TYPE 2 DIABETES MELLITUS WITH COMPLICATION, UNSPECIFIED LONG TERM INSULIN USE STATUS: ICD-10-CM

## 2018-04-27 DIAGNOSIS — E11.8 UNCONTROLLED TYPE 2 DIABETES MELLITUS WITH COMPLICATION, UNSPECIFIED LONG TERM INSULIN USE STATUS: ICD-10-CM

## 2018-04-27 DIAGNOSIS — N28.9 RENAL INSUFFICIENCY: ICD-10-CM

## 2018-04-27 DIAGNOSIS — E78.5 DYSLIPIDEMIA: ICD-10-CM

## 2018-04-27 DIAGNOSIS — E55.9 VITAMIN D DEFICIENCY: ICD-10-CM

## 2018-04-27 RX ORDER — FEBUXOSTAT 80 MG/1
80 TABLET, FILM COATED ORAL DAILY
Qty: 30 TABLET | Refills: 0 | Status: SHIPPED | OUTPATIENT
Start: 2018-04-27 | End: 2018-05-01

## 2018-04-27 NOTE — TELEPHONE ENCOUNTER
----- Message from Janett Hooker sent at 4/27/2018 10:47 AM EDT -----  Contact: PATIENT   PATIENT   REQUEST TO REFIL/ OR ISSUES WITH MEDICATION:    MEDICATION:  ULORIC 80 MG tablet tablet     MESSAGE:  PATIENT HAS CALLED IN REGARDS TO GETTING THE ABOVE MEDICATION SENT TO THE FOLLOWING PHARMACY \      Pharmacy:  [facility] [facility] - [phone]  - [phone] FX Phone:  [phone] Fax:  [phone]   Address:  [address] [place] [id number]         PHONE NUMBER:  [phone]    Test strips have been sent in to the pharmacy above

## 2018-05-01 ENCOUNTER — OFFICE VISIT (OUTPATIENT)
Dept: ENDOCRINOLOGY | Age: 73
End: 2018-05-01

## 2018-05-01 VITALS
WEIGHT: 223.8 LBS | DIASTOLIC BLOOD PRESSURE: 68 MMHG | BODY MASS INDEX: 38.21 KG/M2 | HEIGHT: 64 IN | RESPIRATION RATE: 16 BRPM | SYSTOLIC BLOOD PRESSURE: 132 MMHG

## 2018-05-01 DIAGNOSIS — E16.2 HYPOGLYCEMIA: ICD-10-CM

## 2018-05-01 DIAGNOSIS — E55.9 VITAMIN D DEFICIENCY: ICD-10-CM

## 2018-05-01 DIAGNOSIS — I10 ESSENTIAL HYPERTENSION: ICD-10-CM

## 2018-05-01 DIAGNOSIS — E78.5 DYSLIPIDEMIA: ICD-10-CM

## 2018-05-01 DIAGNOSIS — IMO0002 UNCONTROLLED TYPE 2 DIABETES MELLITUS WITH COMPLICATION, WITH LONG-TERM CURRENT USE OF INSULIN: Primary | ICD-10-CM

## 2018-05-01 DIAGNOSIS — E79.0 HYPERURICEMIA: ICD-10-CM

## 2018-05-01 DIAGNOSIS — E03.9 PRIMARY HYPOTHYROIDISM: ICD-10-CM

## 2018-05-01 DIAGNOSIS — E66.9 CLASS 1 OBESITY: ICD-10-CM

## 2018-05-01 PROCEDURE — 99214 OFFICE O/P EST MOD 30 MIN: CPT | Performed by: INTERNAL MEDICINE

## 2018-05-01 RX ORDER — ALLOPURINOL 100 MG/1
100 TABLET ORAL DAILY
Qty: 90 TABLET | Refills: 3 | Status: SHIPPED | OUTPATIENT
Start: 2018-05-01 | End: 2019-03-18 | Stop reason: SDUPTHER

## 2018-05-01 RX ORDER — LEVOTHYROXINE SODIUM 0.07 MG/1
75 TABLET ORAL DAILY
Qty: 90 TABLET | Refills: 3 | Status: SHIPPED | OUTPATIENT
Start: 2018-05-01 | End: 2019-07-31 | Stop reason: SDUPTHER

## 2018-05-01 NOTE — PROGRESS NOTES
"Subjective   Rachel Albrecht is a 73 y.o. female seen for follow up for Dm2, hyperlipidemia, HTN, vit d deficiency, lab review. Patient is checking BG once a day. No BG readings. She denies hypoglycemic episodes. She denies any problems or concerns. She states that she needs to have a medication that is cheaper than Uloric.     History of Present Illness this is a 73-year-old female known patient with type II diabetes hypertension and dyslipidemia as well as vitamin D deficiency.  Over the course of last 6 months she has had no significant health problems for which to go to the emergency room or hospital.    /68   Resp 16   Ht 162.6 cm (64\")   Wt 102 kg (223 lb 12.8 oz)   BMI 38.42 kg/m²      No Known Allergies    Current Outpatient Prescriptions:   •  B-D ULTRAFINE III SHORT PEN 31G X 8 MM misc, USE TO INJECT INSULIN DAILY, Disp: 100 each, Rfl: 0  •  Blood Glucose Monitoring Suppl (ONE TOUCH ULTRA MINI) W/DEVICE kit, Use to test blood sugar twice a day, Disp: 1 each, Rfl: 1  •  Cholecalciferol (VITAMIN D3) 2000 UNITS tablet, Take 2 tablets by mouth daily., Disp: , Rfl:   •  febuxostat (ULORIC) 80 MG tablet tablet, Take 1 tablet by mouth Daily., Disp: 30 tablet, Rfl: 0  •  glucose blood (ONE TOUCH ULTRA TEST) test strip, Check blood sugar twice daily, Disp: 200 each, Rfl: 3  •  Insulin Pen Needle (B-D ULTRAFINE III SHORT PEN) 31G X 8 MM misc, Use one daily with Levemir injection., Disp: 100 each, Rfl: 3  •  JANUVIA 50 MG tablet, TAKE 1 TABLET DAILY, Disp: 90 tablet, Rfl: 2  •  LEVEMIR FLEXTOUCH 100 UNIT/ML injection, USE AS INSTRUCTED BY YOUR PRESCRIBER, Disp: 60 mL, Rfl: 0  •  metoprolol tartrate (LOPRESSOR) 50 MG tablet, TAKE 1 TABLET DAILY, Disp: 90 tablet, Rfl: 0  •  Multiple Vitamins-Minerals (PRESERVISION AREDS PO), Take 1 tablet by mouth daily., Disp: , Rfl:   •  ONE TOUCH LANCETS misc, Check blood glucose twice daily, Disp: 200 each, Rfl: 3  •  ONE TOUCH ULTRA TEST test strip, USE TO TEST BLOOD " SUGAR TWO TIMES A DAY, Disp: 100 each, Rfl: 1  •  pioglitazone (ACTOS) 45 MG tablet, TAKE 1 TABLET DAILY, Disp: 90 tablet, Rfl: 1  •  simvastatin (ZOCOR) 20 MG tablet, TAKE 1 TABLET EVERY NIGHT, Disp: 90 tablet, Rfl: 0      The following portions of the patient's history were reviewed and updated as appropriate: allergies, current medications, past family history, past medical history, past social history, past surgical history and problem list.    Review of Systems   Constitutional: Negative.    HENT: Negative.    Eyes: Negative.    Respiratory: Negative.    Cardiovascular: Negative.    Gastrointestinal: Negative.    Endocrine: Negative.    Genitourinary: Negative.    Musculoskeletal: Negative.    Skin: Negative.    Allergic/Immunologic: Negative.    Neurological: Negative.    Hematological: Negative.    Psychiatric/Behavioral: Negative.        Objective   Physical Exam   Constitutional: She is oriented to person, place, and time. She appears well-developed and well-nourished. No distress.   HENT:   Head: Normocephalic and atraumatic.   Right Ear: External ear normal.   Left Ear: External ear normal.   Nose: Nose normal.   Mouth/Throat: Oropharynx is clear and moist. No oropharyngeal exudate.   Eyes: Conjunctivae and EOM are normal. Pupils are equal, round, and reactive to light. Right eye exhibits no discharge. Left eye exhibits no discharge. No scleral icterus.   Neck: Normal range of motion. Neck supple. No JVD present. No tracheal deviation present. No thyromegaly present.   Cardiovascular: Normal rate, regular rhythm, normal heart sounds and intact distal pulses.  Exam reveals no gallop and no friction rub.    No murmur heard.  Pulmonary/Chest: Effort normal and breath sounds normal. No respiratory distress. She has no wheezes. She has no rales. She exhibits no tenderness.   Abdominal: Soft. Bowel sounds are normal. She exhibits no distension and no mass. There is no tenderness. There is no rebound and no  guarding. No hernia.   Musculoskeletal: Normal range of motion. She exhibits edema. She exhibits no tenderness or deformity.   1-2+ bilateral edema with stasis dermatitis around her ankles   Lymphadenopathy:     She has no cervical adenopathy.   Neurological: She is alert and oriented to person, place, and time. She has normal reflexes. She displays normal reflexes. No cranial nerve deficit. She exhibits normal muscle tone. Coordination normal.   Skin: Skin is warm and dry. No rash noted. She is not diaphoretic. No erythema. No pallor.   Psychiatric: She has a normal mood and affect. Her behavior is normal. Judgment and thought content normal.   Nursing note and vitals reviewed.       Results for orders placed or performed in visit on 04/17/18   Comprehensive Metabolic Panel   Result Value Ref Range    Glucose 191 (H) 65 - 99 mg/dL    BUN 21 8 - 23 mg/dL    Creatinine 1.42 (H) 0.57 - 1.00 mg/dL    eGFR Non African Am 36 (L) >60 mL/min/1.73    eGFR African Am 44 (L) >60 mL/min/1.73    BUN/Creatinine Ratio 14.8 7.0 - 25.0    Sodium 144 136 - 145 mmol/L    Potassium 5.2 3.5 - 5.2 mmol/L    Chloride 102 98 - 107 mmol/L    Total CO2 27.9 22.0 - 29.0 mmol/L    Calcium 9.2 8.6 - 10.5 mg/dL    Total Protein 6.5 6.0 - 8.5 g/dL    Albumin 3.80 3.50 - 5.20 g/dL    Globulin 2.7 gm/dL    A/G Ratio 1.4 g/dL    Total Bilirubin 0.7 0.1 - 1.2 mg/dL    Alkaline Phosphatase 83 39 - 117 U/L    AST (SGOT) 19 1 - 32 U/L    ALT (SGPT) 14 1 - 33 U/L   Lipid Panel   Result Value Ref Range    Total Cholesterol 170 0 - 200 mg/dL    Triglycerides 103 0 - 150 mg/dL    HDL Cholesterol 46 40 - 60 mg/dL    VLDL Cholesterol 20.6 5 - 40 mg/dL    LDL Cholesterol  103 (H) 0 - 100 mg/dL   T4 & TSH ([facility])   Result Value Ref Range    TSH 4.380 (H) 0.270 - 4.200 mIU/mL    T4, Total 6.69 4.50 - 11.70 mcg/dL   Hemoglobin A1c   Result Value Ref Range    Hemoglobin A1C 6.91 (H) 4.80 - 5.60 %   C-Peptide   Result Value Ref Range    C-Peptide 3.4 1.1 - 4.4  ng/mL   Vitamin D 25 Hydroxy   Result Value Ref Range    25 Hydroxy, Vitamin D 66.8 30.0 - 100.0 ng/ml   MicroAlbumin, Urine, Random   Result Value Ref Range    Microalbumin, Urine 7.9 Not Estab. ug/mL   Uric Acid   Result Value Ref Range    Uric Acid 2.0 (L) 2.4 - 5.7 mg/dL   Cardiovascular Risk Assessment   Result Value Ref Range    Interpretation Note    Diabetes Patient Education   Result Value Ref Range    PDF Image Not applicable          Assessment/Plan   Diagnoses and all orders for this visit:    Uncontrolled type 2 diabetes mellitus with complication, with long-term current use of insulin  -     levothyroxine (SYNTHROID) 75 MCG tablet; Take 1 tablet by mouth Daily. On empty stomach  -     T3, Free; Future  -     T4 & TSH ([facility]); Future  -     T4, Free; Future  -     Thyroglobulin With Anti-TG; Future  -     Uric Acid; Future  -     Vitamin D 25 Hydroxy; Future  -     Comprehensive Metabolic Panel; Future  -     C-Peptide; Future  -     Hemoglobin A1c; Future  -     Lipid Panel; Future  -     MicroAlbumin, Urine, Random - Urine, Clean Catch; Future    Essential hypertension  -     levothyroxine (SYNTHROID) 75 MCG tablet; Take 1 tablet by mouth Daily. On empty stomach  -     T3, Free; Future  -     T4 & TSH ([facility]); Future  -     T4, Free; Future  -     Thyroglobulin With Anti-TG; Future  -     Uric Acid; Future  -     Vitamin D 25 Hydroxy; Future  -     Comprehensive Metabolic Panel; Future  -     C-Peptide; Future  -     Hemoglobin A1c; Future  -     Lipid Panel; Future  -     MicroAlbumin, Urine, Random - Urine, Clean Catch; Future    Vitamin D deficiency  -     levothyroxine (SYNTHROID) 75 MCG tablet; Take 1 tablet by mouth Daily. On empty stomach  -     T3, Free; Future  -     T4 & TSH ([facility]); Future  -     T4, Free; Future  -     Thyroglobulin With Anti-TG; Future  -     Uric Acid; Future  -     Vitamin D 25 Hydroxy; Future  -     Comprehensive Metabolic Panel; Future  -     C-Peptide;  Future  -     Hemoglobin A1c; Future  -     Lipid Panel; Future  -     MicroAlbumin, Urine, Random - Urine, Clean Catch; Future    Class 1 obesity  -     levothyroxine (SYNTHROID) 75 MCG tablet; Take 1 tablet by mouth Daily. On empty stomach  -     T3, Free; Future  -     T4 & TSH ([facility]); Future  -     T4, Free; Future  -     Thyroglobulin With Anti-TG; Future  -     Uric Acid; Future  -     Vitamin D 25 Hydroxy; Future  -     Comprehensive Metabolic Panel; Future  -     C-Peptide; Future  -     Hemoglobin A1c; Future  -     Lipid Panel; Future  -     MicroAlbumin, Urine, Random - Urine, Clean Catch; Future    Hypoglycemia  -     levothyroxine (SYNTHROID) 75 MCG tablet; Take 1 tablet by mouth Daily. On empty stomach  -     T3, Free; Future  -     T4 & TSH ([facility]); Future  -     T4, Free; Future  -     Thyroglobulin With Anti-TG; Future  -     Uric Acid; Future  -     Vitamin D 25 Hydroxy; Future  -     Comprehensive Metabolic Panel; Future  -     C-Peptide; Future  -     Hemoglobin A1c; Future  -     Lipid Panel; Future  -     MicroAlbumin, Urine, Random - Urine, Clean Catch; Future    Dyslipidemia  -     levothyroxine (SYNTHROID) 75 MCG tablet; Take 1 tablet by mouth Daily. On empty stomach  -     T3, Free; Future  -     T4 & TSH ([facility]); Future  -     T4, Free; Future  -     Thyroglobulin With Anti-TG; Future  -     Uric Acid; Future  -     Vitamin D 25 Hydroxy; Future  -     Comprehensive Metabolic Panel; Future  -     C-Peptide; Future  -     Hemoglobin A1c; Future  -     Lipid Panel; Future  -     MicroAlbumin, Urine, Random - Urine, Clean Catch; Future    Hyperuricemia  -     levothyroxine (SYNTHROID) 75 MCG tablet; Take 1 tablet by mouth Daily. On empty stomach  -     T3, Free; Future  -     T4 & TSH ([facility]); Future  -     T4, Free; Future  -     Thyroglobulin With Anti-TG; Future  -     Uric Acid; Future  -     Vitamin D 25 Hydroxy; Future  -     Comprehensive Metabolic Panel; Future  -      C-Peptide; Future  -     Hemoglobin A1c; Future  -     Lipid Panel; Future  -     MicroAlbumin, Urine, Random - Urine, Clean Catch; Future    Primary hypothyroidism  -     levothyroxine (SYNTHROID) 75 MCG tablet; Take 1 tablet by mouth Daily. On empty stomach  -     T3, Free; Future  -     T4 & TSH ([facility]); Future  -     T4, Free; Future  -     Thyroglobulin With Anti-TG; Future  -     Uric Acid; Future  -     Vitamin D 25 Hydroxy; Future  -     Comprehensive Metabolic Panel; Future  -     C-Peptide; Future  -     Hemoglobin A1c; Future  -     Lipid Panel; Future  -     MicroAlbumin, Urine, Random - Urine, Clean Catch; Future    Other orders  -     allopurinol (ZYLOPRIM) 100 MG tablet; Take 1 tablet by mouth Daily.               In summary I saw and examined this 73-year-old female for above-mentioned problems.  I reviewed her laboratory evaluation of 04/17/2018 and provided her with a hard copy of it.  She is overall clinically and metabolically stable however she has a new diagnosis of hypothyroidism for which I am going to go ahead and start her on levothyroxin 75 µg daily she will continue rest of her prescriptions.  She will see Ms. Komal Albert in 4 months or sooner if needed with laboratory evaluation prior to each office visit.

## 2018-05-16 DIAGNOSIS — IMO0002 UNCONTROLLED TYPE 2 DIABETES MELLITUS: ICD-10-CM

## 2018-05-16 DIAGNOSIS — E55.9 VITAMIN D DEFICIENCY: ICD-10-CM

## 2018-05-16 DIAGNOSIS — N28.9 RENAL INSUFFICIENCY: ICD-10-CM

## 2018-05-16 DIAGNOSIS — E78.5 DYSLIPIDEMIA: ICD-10-CM

## 2018-05-16 RX ORDER — PEN NEEDLE, DIABETIC 31 GX5/16"
NEEDLE, DISPOSABLE MISCELLANEOUS
Qty: 100 EACH | Refills: 0 | Status: SHIPPED | OUTPATIENT
Start: 2018-05-16 | End: 2018-09-22 | Stop reason: SDUPTHER

## 2018-05-18 ENCOUNTER — TELEPHONE (OUTPATIENT)
Dept: ENDOCRINOLOGY | Age: 73
End: 2018-05-18

## 2018-05-18 DIAGNOSIS — N28.9 RENAL INSUFFICIENCY: ICD-10-CM

## 2018-05-18 DIAGNOSIS — E78.5 DYSLIPIDEMIA: ICD-10-CM

## 2018-05-18 DIAGNOSIS — E55.9 VITAMIN D DEFICIENCY: ICD-10-CM

## 2018-05-18 RX ORDER — SIMVASTATIN 20 MG
TABLET ORAL
Qty: 90 TABLET | Refills: 0 | Status: SHIPPED | OUTPATIENT
Start: 2018-05-18 | End: 2018-08-23 | Stop reason: SDUPTHER

## 2018-05-18 NOTE — TELEPHONE ENCOUNTER
----- Message from ALECIA Sims sent at 5/18/2018 12:09 PM EDT -----  Contact: PATIENT  Okay let pt know to cut back and do NOT titrate dose of insulin. Go back to 45 units once daily in am. Do not fluctuate with the dose based on blood sugars. I am not sure where that info came from. bs's safer too high than too low. I will send rx for glucagon kit to pt pharm. Have pt call bs's to office in 1-2 weeks.   ----- Message -----  From: Rachel Aguirre MA  Sent: 5/18/2018  10:26 AM  To: ALECIA Sims    When I looked back at the medication when it was ordered prior it looks like she was on 60 units daily. She was seen on 1/30/17 by you with no med changes at the time, then the patient called in on 2/16/17 about BG. It shows that Shannon called the patient and you had said BG is in good range and advised pt to cut back to 50 units of Lantus if she became symptomatic at BG reading of 111.It looks like it was then sent in on the same day by Lyssa and the directions were changed to use a instructed by prescriber.   ----- Message -----  From: ALECIA Sims  Sent: 5/18/2018   9:58 AM  To: Rachel Aguirre MA    Do some investigation in her chart. What she is doing with her insulin makes no sense and I would never prescribe it to be taken that way. How it is in the chart makes no sense. I never write rxs to take as directed by provider. I need more info on this.   ----- Message -----  From: Rachel Aguirre MA  Sent: 5/18/2018   9:39 AM  To: ALECIA Sims      ----- Message -----  From: Leora Gomez  Sent: 5/18/2018   9:12 AM  To: Lyssa Emmanuel MA, Rachel Aguirre MA    PATIENT WENT TO ER YESTERDAY IN AMBULANCE AND  FOUND HER ON THE FLOOR AROUND 3 PM.  PATIENT HAD PASSED OUT.   GAVE HER 3 TUBES GLUCOSE AND IT DID NOT REVIVE HER.AMBULANCE GAVE HER A SHOT THAT REVIVED HER,   PATIENT IS WANTING TO KNOW IF HER INSULIN CAN BE CHANGED, SHE IS TAKING TO MUCH IN THE MORNING  TIME. SHE IS WANTING TO KNOW IF IT CAN BE SPLIT TO DIFFERENT TIMES. ER SUGGESTED THAT SHE TAKE SOMETHING THAT IS NOT SO POWERFUL IN THE MORNING.       LEVEMIR FLEXTOUCH 100 UNIT/ML PATIENT TAKES 60 IF BLOOD LEVEL  IS OVER 100 AND 50 IF UNDER 100    PATIENT ALSO TAKES JANUVIA 50 MG tablet IN THE MORNING     simvastatin (ZOCOR) 20 MG tablet    pioglitazone (ACTOS) 45 MG tablet    allopurinol (ZYLOPRIM) 100 MG tablet     metoprolol tartrate (LOPRESSOR) 50 MG    PATIENT TAKES ALL THESE AT THE SAME TIME IN THE MORNING.     Spoke to the patient and informed her that Komal wants her to do 45 units once in the morning and not to titrate anymore. Pt expressed understanding and repeated the instructions to me.

## 2018-05-21 RX ORDER — NATEGLINIDE 60 MG/1
60 TABLET ORAL
Qty: 270 TABLET | Refills: 1 | Status: SHIPPED | OUTPATIENT
Start: 2018-05-21 | End: 2018-11-07 | Stop reason: SDUPTHER

## 2018-05-23 ENCOUNTER — TELEPHONE (OUTPATIENT)
Dept: ENDOCRINOLOGY | Age: 73
End: 2018-05-23

## 2018-05-23 NOTE — TELEPHONE ENCOUNTER
----- Message from Marilin Bird MD sent at 5/21/2018  4:59 PM EDT -----  Contact: PATIENT  I am adding a Starlix 60 mg before each meal and therefore she should start from 40 units daily and then every 3 days if her fasting blood glucose is greater than 140 add 2 units to the existing dose.  More importantly is she and her  need to sit down and go over the events that preceded low blood sugar such as extra physical activity, missing a meal or eating a lot less.  Usually one or a combination of these cause low blood sugar and as always the best way to deal with it is to prevent that from happening.  ----- Message -----  From: Lyssa Emmanuel MA  Sent: 5/21/2018   7:31 AM  To: Marilin Bird MD        ----- Message -----  From: Leora Gomez  Sent: 5/18/2018   9:12 AM  To: Lyssa Emmanuel MA, Rachel Aguirre MA    PATIENT WENT TO ER YESTERDAY IN AMBULANCE AND  FOUND HER ON THE FLOOR AROUND 3 PM.  PATIENT HAD PASSED OUT.   GAVE HER 3 TUBES GLUCOSE AND IT DID NOT REVIVE HER.AMBULANCE GAVE HER A SHOT THAT REVIVED HER,   PATIENT IS WANTING TO KNOW IF HER INSULIN CAN BE CHANGED, SHE IS TAKING TO MUCH IN THE MORNING TIME. SHE IS WANTING TO KNOW IF IT CAN BE SPLIT TO DIFFERENT TIMES. ER SUGGESTED THAT SHE TAKE SOMETHING THAT IS NOT SO POWERFUL IN THE MORNING.       LEVEMIR FLEXTOUCH 100 UNIT/ML PATIENT TAKES 60 IF BLOOD LEVEL  IS OVER 100 AND 50 IF UNDER 100    PATIENT ALSO TAKES JANUVIA 50 MG tablet IN THE MORNING     simvastatin (ZOCOR) 20 MG tablet    pioglitazone (ACTOS) 45 MG tablet    allopurinol (ZYLOPRIM) 100 MG tablet     metoprolol tartrate (LOPRESSOR) 50 MG    PATIENT TAKES ALL THESE AT THE SAME TIME IN THE MORNING.       Spoke to patient. Discussed Dr. Bird message and medication dosage. Patient stated that she understood.

## 2018-05-30 ENCOUNTER — TELEPHONE (OUTPATIENT)
Dept: ENDOCRINOLOGY | Age: 73
End: 2018-05-30

## 2018-05-30 NOTE — TELEPHONE ENCOUNTER
----- Message from Marilin Bird MD sent at 5/29/2018  4:31 PM EDT -----  Lowered the dose of insulin from 45 units every morning to 35 units every morning and make sure you eat before going to bed and let me know what happened.  ----- Message -----  From: Lyssa Emmanuel MA  Sent: 5/29/2018   4:05 PM  To: Marilin Bird MD    Patient states that she had a low BG episode on Friday and the only difference is she is now taking thyroid medication. She states that this is the second low BG episode in 2 weeks.     Patient left voicemail stating she called one week ago and did not get a call back but I called patient and left voicemail informing her of our conversation on 05/23/2018 in regards to her medications and asked for a call back to discuss new message and previous message if there were questions.     Left voicemail on patient's home and cell asking for return call.

## 2018-06-04 ENCOUNTER — TELEPHONE (OUTPATIENT)
Dept: ENDOCRINOLOGY | Age: 73
End: 2018-06-04

## 2018-06-04 NOTE — TELEPHONE ENCOUNTER
Spoke to patient. She states that she had a low BG episode on Saturday of 50 while she was driving and EMS was called. I went over dose and medications with patient and instructed her that per message from Dr. Bird last week to reduce Levemir to 35 units and eat bedtime snack. Informed patient of calls and voicemail on 05/30/2018. Patient stated that she understood. I asked patient to call our office if she has any other hypoglycemic episodes.

## 2018-06-18 RX ORDER — PIOGLITAZONEHYDROCHLORIDE 45 MG/1
TABLET ORAL
Qty: 90 TABLET | Refills: 1 | Status: SHIPPED | OUTPATIENT
Start: 2018-06-18 | End: 2018-11-23 | Stop reason: SDUPTHER

## 2018-07-04 DIAGNOSIS — E78.5 DYSLIPIDEMIA: ICD-10-CM

## 2018-07-04 DIAGNOSIS — N28.9 RENAL INSUFFICIENCY: ICD-10-CM

## 2018-07-04 DIAGNOSIS — E55.9 VITAMIN D DEFICIENCY: ICD-10-CM

## 2018-07-04 DIAGNOSIS — IMO0001 UNCONTROLLED DIABETES MELLITUS TYPE 2 WITHOUT COMPLICATIONS, UNSPECIFIED LONG TERM INSULIN USE STATUS: ICD-10-CM

## 2018-07-05 RX ORDER — METOPROLOL TARTRATE 50 MG/1
TABLET, FILM COATED ORAL
Qty: 90 TABLET | Refills: 0 | Status: SHIPPED | OUTPATIENT
Start: 2018-07-05 | End: 2018-10-05 | Stop reason: SDUPTHER

## 2018-07-11 ENCOUNTER — TELEPHONE (OUTPATIENT)
Dept: ENDOCRINOLOGY | Age: 73
End: 2018-07-11

## 2018-07-11 NOTE — TELEPHONE ENCOUNTER
----- Message from Marilin Bird MD sent at 7/10/2018  6:24 PM EDT -----  I asked her to drop Levemir to 25 units.  ----- Message -----  From: Lyssa Emmanuel MA  Sent: 7/10/2018   1:40 PM  To: Marilin Bird MD    Patient saw PCP and they wanted her to inform us.    35 units of Levemir and Starlix before each meal    She states that she is having low BG around lunchtime. She stopped taking Starlx in the AM but is still taking it at lunch and dinner.    BG was 144 yesterday and then before lunch BG had dropped to 70.     Spoke to patient. Informed her of change to Levemir. Patient expressed understanding.

## 2018-08-23 DIAGNOSIS — E55.9 VITAMIN D DEFICIENCY: ICD-10-CM

## 2018-08-23 DIAGNOSIS — N28.9 RENAL INSUFFICIENCY: ICD-10-CM

## 2018-08-23 DIAGNOSIS — E78.5 DYSLIPIDEMIA: ICD-10-CM

## 2018-08-24 RX ORDER — SIMVASTATIN 20 MG
TABLET ORAL
Qty: 90 TABLET | Refills: 0 | Status: SHIPPED | OUTPATIENT
Start: 2018-08-24 | End: 2018-11-26 | Stop reason: SDUPTHER

## 2018-08-24 RX ORDER — SITAGLIPTIN 50 MG/1
TABLET, FILM COATED ORAL
Qty: 90 TABLET | Refills: 2 | Status: SHIPPED | OUTPATIENT
Start: 2018-08-24 | End: 2019-05-18 | Stop reason: SDUPTHER

## 2018-08-27 ENCOUNTER — RESULTS ENCOUNTER (OUTPATIENT)
Dept: ENDOCRINOLOGY | Age: 73
End: 2018-08-27

## 2018-08-27 DIAGNOSIS — I10 ESSENTIAL HYPERTENSION: ICD-10-CM

## 2018-08-27 DIAGNOSIS — IMO0002 UNCONTROLLED TYPE 2 DIABETES MELLITUS WITH COMPLICATION, WITH LONG-TERM CURRENT USE OF INSULIN: ICD-10-CM

## 2018-08-27 DIAGNOSIS — E78.5 DYSLIPIDEMIA: ICD-10-CM

## 2018-08-27 DIAGNOSIS — E55.9 VITAMIN D DEFICIENCY: ICD-10-CM

## 2018-08-27 DIAGNOSIS — E03.9 PRIMARY HYPOTHYROIDISM: ICD-10-CM

## 2018-08-27 DIAGNOSIS — E66.9 CLASS 1 OBESITY: ICD-10-CM

## 2018-08-27 DIAGNOSIS — E79.0 HYPERURICEMIA: ICD-10-CM

## 2018-08-27 DIAGNOSIS — E16.2 HYPOGLYCEMIA: ICD-10-CM

## 2018-09-11 DIAGNOSIS — E79.0 HYPERURICEMIA: ICD-10-CM

## 2018-09-11 DIAGNOSIS — E16.2 HYPOGLYCEMIA: ICD-10-CM

## 2018-09-11 DIAGNOSIS — E55.9 VITAMIN D DEFICIENCY: Primary | ICD-10-CM

## 2018-09-11 DIAGNOSIS — E11.9 TYPE 2 DIABETES MELLITUS WITHOUT COMPLICATION, UNSPECIFIED LONG TERM INSULIN USE STATUS: ICD-10-CM

## 2018-09-19 DIAGNOSIS — E55.9 VITAMIN D DEFICIENCY: ICD-10-CM

## 2018-09-19 DIAGNOSIS — E78.5 DYSLIPIDEMIA: ICD-10-CM

## 2018-09-19 DIAGNOSIS — N28.9 RENAL INSUFFICIENCY: ICD-10-CM

## 2018-09-19 DIAGNOSIS — IMO0002 UNCONTROLLED TYPE 2 DIABETES MELLITUS: ICD-10-CM

## 2018-09-20 ENCOUNTER — LAB (OUTPATIENT)
Dept: ENDOCRINOLOGY | Age: 73
End: 2018-09-20

## 2018-09-20 DIAGNOSIS — E11.9 TYPE 2 DIABETES MELLITUS WITHOUT COMPLICATION, UNSPECIFIED LONG TERM INSULIN USE STATUS: ICD-10-CM

## 2018-09-20 DIAGNOSIS — E55.9 VITAMIN D DEFICIENCY: ICD-10-CM

## 2018-09-20 DIAGNOSIS — E79.0 HYPERURICEMIA: ICD-10-CM

## 2018-09-20 DIAGNOSIS — E16.2 HYPOGLYCEMIA: ICD-10-CM

## 2018-09-21 LAB
25(OH)D3+25(OH)D2 SERPL-MCNC: 68.7 NG/ML (ref 30–100)
ALBUMIN SERPL-MCNC: 3.8 G/DL (ref 3.5–5.2)
ALBUMIN/GLOB SERPL: 1.4 G/DL
ALP SERPL-CCNC: 84 U/L (ref 39–117)
ALT SERPL-CCNC: 11 U/L (ref 1–33)
AST SERPL-CCNC: 18 U/L (ref 1–32)
BILIRUB SERPL-MCNC: 0.4 MG/DL (ref 0.1–1.2)
BUN SERPL-MCNC: 22 MG/DL (ref 8–23)
BUN/CREAT SERPL: 15.6 (ref 7–25)
C PEPTIDE SERPL-MCNC: 2.8 NG/ML (ref 1.1–4.4)
CALCIUM SERPL-MCNC: 9.1 MG/DL (ref 8.6–10.5)
CHLORIDE SERPL-SCNC: 103 MMOL/L (ref 98–107)
CHOLEST SERPL-MCNC: 160 MG/DL (ref 0–200)
CO2 SERPL-SCNC: 27.7 MMOL/L (ref 22–29)
CREAT SERPL-MCNC: 1.41 MG/DL (ref 0.57–1)
GLOBULIN SER CALC-MCNC: 2.7 GM/DL
GLUCOSE SERPL-MCNC: 99 MG/DL (ref 65–99)
HBA1C MFR BLD: 7.2 % (ref 4.8–5.6)
HDLC SERPL-MCNC: 39 MG/DL (ref 40–60)
INTERPRETATION: NORMAL
LDLC SERPL CALC-MCNC: 102 MG/DL (ref 0–100)
Lab: NORMAL
MICROALBUMIN UR-MCNC: 187.4 UG/ML
POTASSIUM SERPL-SCNC: 5.2 MMOL/L (ref 3.5–5.2)
PROT SERPL-MCNC: 6.5 G/DL (ref 6–8.5)
SODIUM SERPL-SCNC: 144 MMOL/L (ref 136–145)
TRIGL SERPL-MCNC: 94 MG/DL (ref 0–150)
URATE SERPL-MCNC: 4.7 MG/DL (ref 2.4–5.7)
VLDLC SERPL CALC-MCNC: 18.8 MG/DL (ref 5–40)

## 2018-09-22 DIAGNOSIS — E78.5 DYSLIPIDEMIA: ICD-10-CM

## 2018-09-22 DIAGNOSIS — E55.9 VITAMIN D DEFICIENCY: ICD-10-CM

## 2018-09-22 DIAGNOSIS — N28.9 RENAL INSUFFICIENCY: ICD-10-CM

## 2018-09-22 DIAGNOSIS — IMO0002 UNCONTROLLED TYPE 2 DIABETES MELLITUS: ICD-10-CM

## 2018-09-24 RX ORDER — PEN NEEDLE, DIABETIC 31 GX5/16"
NEEDLE, DISPOSABLE MISCELLANEOUS
Qty: 100 EACH | Refills: 0 | Status: SHIPPED | OUTPATIENT
Start: 2018-09-24 | End: 2019-01-02 | Stop reason: SDUPTHER

## 2018-10-04 ENCOUNTER — OFFICE VISIT (OUTPATIENT)
Dept: ENDOCRINOLOGY | Age: 73
End: 2018-10-04

## 2018-10-04 VITALS
DIASTOLIC BLOOD PRESSURE: 68 MMHG | BODY MASS INDEX: 36.88 KG/M2 | WEIGHT: 216 LBS | HEIGHT: 64 IN | SYSTOLIC BLOOD PRESSURE: 130 MMHG

## 2018-10-04 DIAGNOSIS — N28.9 RENAL INSUFFICIENCY: ICD-10-CM

## 2018-10-04 DIAGNOSIS — E78.5 DYSLIPIDEMIA: ICD-10-CM

## 2018-10-04 DIAGNOSIS — E55.9 VITAMIN D DEFICIENCY: ICD-10-CM

## 2018-10-04 DIAGNOSIS — E11.65 UNCONTROLLED TYPE 2 DIABETES MELLITUS WITH HYPERGLYCEMIA (HCC): Primary | ICD-10-CM

## 2018-10-04 DIAGNOSIS — E79.0 HYPERURICEMIA: ICD-10-CM

## 2018-10-04 PROCEDURE — 99214 OFFICE O/P EST MOD 30 MIN: CPT | Performed by: NURSE PRACTITIONER

## 2018-10-04 RX ORDER — FEBUXOSTAT 80 MG/1
80 TABLET, FILM COATED ORAL DAILY
COMMUNITY
End: 2020-04-17 | Stop reason: ALTCHOICE

## 2018-10-04 NOTE — PROGRESS NOTES
"Subjective   Rachel Albrecht is a 73 y.o. female is here today for follow-up.  Chief Complaint   Patient presents with   • Diabetes     Recent labs, pt tests BG 1x daily, pt brought BG log   • Hypothyroidism   • Hypertension   • Hyperlipidemia   • Vitamin D Deficiency   • hyperuricemia     /68   Ht 162.6 cm (64\")   Wt 98 kg (216 lb)   BMI 37.08 kg/m²   Current Outpatient Prescriptions on File Prior to Visit   Medication Sig   • allopurinol (ZYLOPRIM) 100 MG tablet Take 1 tablet by mouth Daily.   • B-D ULTRAFINE III SHORT PEN 31G X 8 MM misc USE FOR INSULIN INJECTIONS DAILY AS DIRECTED   • Blood Glucose Monitoring Suppl (ONE TOUCH ULTRA MINI) W/DEVICE kit Use to test blood sugar twice a day   • Cholecalciferol (VITAMIN D3) 2000 UNITS tablet Take 2 tablets by mouth daily.   • glucagon (GLUCAGEN) 1 MG injection Inject 1 mg under the skin See Admin Instructions. Follow package directions for low blood sugar.   • insulin detemir (LEVEMIR FLEXTOUCH) 100 UNIT/ML injection Inject 45 Units under the skin into the appropriate area as directed Every Morning. (Patient taking differently: Inject 25 Units under the skin into the appropriate area as directed Every Morning.)   • Insulin Pen Needle (B-D ULTRAFINE III SHORT PEN) 31G X 8 MM misc Use one daily with Levemir injection.   • JANUVIA 50 MG tablet TAKE 1 TABLET DAILY   • levothyroxine (SYNTHROID) 75 MCG tablet Take 1 tablet by mouth Daily. On empty stomach   • metoprolol tartrate (LOPRESSOR) 50 MG tablet TAKE 1 TABLET DAILY   • Multiple Vitamins-Minerals (PRESERVISION AREDS PO) Take 1 tablet by mouth daily.   • nateglinide (STARLIX) 60 MG tablet Take 1 tablet by mouth 3 (Three) Times a Day Before Meals.   • ONE TOUCH LANCETS misc Check blood glucose twice daily   • ONE TOUCH ULTRA TEST test strip USE TO TEST BLOOD SUGAR TWO TIMES A DAY AS DIRECTED   • pioglitazone (ACTOS) 45 MG tablet TAKE 1 TABLET DAILY   • simvastatin (ZOCOR) 20 MG tablet TAKE 1 TABLET EVERY NIGHT "   • [DISCONTINUED] glucose blood (ONE TOUCH ULTRA TEST) test strip Check blood sugar twice daily   • [DISCONTINUED] insulin detemir (LEVEMIR FLEXTOUCH) 100 UNIT/ML injection 45 units daily in am     No current facility-administered medications on file prior to visit.      Family History   Problem Relation Age of Onset   • Diabetes Father    • Kidney disease Father    • Heart attack Father    • Diabetes Brother    • Kidney disease Brother      Social History   Substance Use Topics   • Smoking status: Never Smoker   • Smokeless tobacco: Not on file   • Alcohol use No     No Known Allergies      History of Present Illness  Encounter Diagnoses   Name Primary?   • Vitamin D deficiency    • Uncontrolled type 2 diabetes mellitus with hyperglycemia (CMS/[facility]) Yes   • Renal insufficiency    • Dyslipidemia    • Hyperuricemia    This is a 73-year-old female patient here today for routine follow-up visit.  She has been seen for the above-mentioned problems.  She is accompanied by her  at today's visit.  She states she is taking her medications as prescribed.  She has decreased her insulin down to 25 units daily.  She has had no significant hypoglycemic events.  She did have a significant hypoglycemic event and may in which she had to go to the emergency room.  They do have glucagon at home and has been is aware of how to use it.  She had recent labs which were reviewed and she was provided a copy.  She denies any complaints of hypothyroidism at today's visit.  She does have questions concerning vaccinations for preventative health care      The following portions of the patient's history were reviewed and updated as appropriate: allergies, current medications, past family history, past medical history, past social history, past surgical history and problem list.    Review of Systems   Constitutional: Negative for fatigue.   HENT: Negative for trouble swallowing.    Eyes: Negative for visual disturbance.   Respiratory:  Negative for shortness of breath.    Cardiovascular: Negative for leg swelling.   Endocrine: Negative for polyuria.   Skin: Negative for wound.   Neurological: Negative for numbness.       Objective   Physical Exam   Constitutional: She is oriented to person, place, and time. She appears well-developed and well-nourished. No distress.   Flat affect   HENT:   Head: Normocephalic and atraumatic.   Right Ear: External ear normal.   Left Ear: External ear normal.   Nose: Nose normal.   Mouth/Throat: Oropharynx is clear and moist. No oropharyngeal exudate.   Eyes: Pupils are equal, round, and reactive to light. EOM are normal. Right eye exhibits no discharge. Left eye exhibits no discharge.   Neck: Trachea normal, normal range of motion and full passive range of motion without pain. Neck supple. No tracheal tenderness present. Carotid bruit is not present. No tracheal deviation, no edema and no erythema present. No thyroid mass and no thyromegaly present.   Cardiovascular: Normal rate, regular rhythm, normal heart sounds and intact distal pulses.  Exam reveals no gallop and no friction rub.    No murmur heard.  Pulmonary/Chest: Effort normal and breath sounds normal. No stridor. No respiratory distress. She has no wheezes. She has no rales.   Abdominal: Soft. Bowel sounds are normal. She exhibits no distension.   Musculoskeletal: Normal range of motion. She exhibits edema. She exhibits no deformity.   2 + pitting BLE   Lymphadenopathy:     She has no cervical adenopathy.   Neurological: She is alert and oriented to person, place, and time.   Skin: Skin is warm and dry. No rash noted. She is not diaphoretic. No erythema. No pallor.   Psychiatric: She has a normal mood and affect. Her behavior is normal. Judgment and thought content normal.   Nursing note and vitals reviewed.    Results for orders placed or performed in visit on 09/20/18   Comprehensive Metabolic Panel   Result Value Ref Range    Glucose 99 65 - 99 mg/dL     BUN 22 8 - 23 mg/dL    Creatinine 1.41 (H) 0.57 - 1.00 mg/dL    eGFR Non African Am 36 (L) >60 mL/min/1.73    eGFR African Am 44 (L) >60 mL/min/1.73    BUN/Creatinine Ratio 15.6 7.0 - 25.0    Sodium 144 136 - 145 mmol/L    Potassium 5.2 3.5 - 5.2 mmol/L    Chloride 103 98 - 107 mmol/L    Total CO2 27.7 22.0 - 29.0 mmol/L    Calcium 9.1 8.6 - 10.5 mg/dL    Total Protein 6.5 6.0 - 8.5 g/dL    Albumin 3.80 3.50 - 5.20 g/dL    Globulin 2.7 gm/dL    A/G Ratio 1.4 g/dL    Total Bilirubin 0.4 0.1 - 1.2 mg/dL    Alkaline Phosphatase 84 39 - 117 U/L    AST (SGOT) 18 1 - 32 U/L    ALT (SGPT) 11 1 - 33 U/L   C-Peptide   Result Value Ref Range    C-Peptide 2.8 1.1 - 4.4 ng/mL   Hemoglobin A1c   Result Value Ref Range    Hemoglobin A1C 7.20 (H) 4.80 - 5.60 %   Lipid Panel   Result Value Ref Range    Total Cholesterol 160 0 - 200 mg/dL    Triglycerides 94 0 - 150 mg/dL    HDL Cholesterol 39 (L) 40 - 60 mg/dL    VLDL Cholesterol 18.8 5 - 40 mg/dL    LDL Cholesterol  102 (H) 0 - 100 mg/dL   MicroAlbumin, Urine, Random - Urine, Clean Catch   Result Value Ref Range    Microalbumin, Urine 187.4 Not Estab. ug/mL   Uric Acid   Result Value Ref Range    Uric Acid 4.7 2.4 - 5.7 mg/dL   Vitamin D 25 Hydroxy   Result Value Ref Range    25 Hydroxy, Vitamin D 68.7 30.0 - 100.0 ng/ml   Cardiovascular Risk Assessment   Result Value Ref Range    Interpretation Note    Diabetes Patient Education   Result Value Ref Range    PDF Image Not applicable          Assessment/Plan   Problems Addressed this Visit        Digestive    Vitamin D deficiency       Endocrine    Uncontrolled type 2 diabetes mellitus (CMS/HCC) - Primary       Genitourinary    Renal insufficiency       Other    Dyslipidemia    Hyperuricemia        In summary, patient was seen and examined.  Her blood sugars were reviewed.  Her blood sugars are typically in the low 100 range.  She does have an occasional blood sugar over 200.  She has had no hypoglycemic events.  She will  follow-up with her primary care provider regarding her vaccinations concerns.  Her thyroid hormones are an satisfactory range according to her recent labs.  Metabolically she is stable and her medications were added or changed at today's visit.  She will follow-up with Dr. Bird or myself in 6 months with labs.  I've encouraged him to reach out to the office should she have any questions or concerns.  We did discuss prevention of hypoglycemia.  Her hemoglobin A1c is higher than last visit however she has had no hypoglycemic events so is better off in the higher range.

## 2018-10-05 DIAGNOSIS — E55.9 VITAMIN D DEFICIENCY: ICD-10-CM

## 2018-10-05 DIAGNOSIS — IMO0001 UNCONTROLLED DIABETES MELLITUS TYPE 2 WITHOUT COMPLICATIONS: ICD-10-CM

## 2018-10-05 DIAGNOSIS — E78.5 DYSLIPIDEMIA: ICD-10-CM

## 2018-10-05 DIAGNOSIS — N28.9 RENAL INSUFFICIENCY: ICD-10-CM

## 2018-10-05 RX ORDER — METOPROLOL TARTRATE 50 MG/1
TABLET, FILM COATED ORAL
Qty: 90 TABLET | Refills: 1 | Status: SHIPPED | OUTPATIENT
Start: 2018-10-05 | End: 2019-04-05 | Stop reason: SDUPTHER

## 2018-11-07 RX ORDER — NATEGLINIDE 60 MG/1
TABLET ORAL
Qty: 270 TABLET | Refills: 1 | Status: SHIPPED | OUTPATIENT
Start: 2018-11-07 | End: 2019-09-05

## 2018-11-23 RX ORDER — PIOGLITAZONEHYDROCHLORIDE 45 MG/1
TABLET ORAL
Qty: 90 TABLET | Refills: 1 | Status: SHIPPED | OUTPATIENT
Start: 2018-11-23 | End: 2019-06-05 | Stop reason: SDUPTHER

## 2018-11-26 RX ORDER — SIMVASTATIN 20 MG
TABLET ORAL
Qty: 90 TABLET | Refills: 0 | Status: SHIPPED | OUTPATIENT
Start: 2018-11-26 | End: 2019-02-18 | Stop reason: SDUPTHER

## 2019-01-02 DIAGNOSIS — N28.9 RENAL INSUFFICIENCY: ICD-10-CM

## 2019-01-02 DIAGNOSIS — E78.5 DYSLIPIDEMIA: ICD-10-CM

## 2019-01-02 DIAGNOSIS — E55.9 VITAMIN D DEFICIENCY: ICD-10-CM

## 2019-01-02 DIAGNOSIS — IMO0002 UNCONTROLLED TYPE 2 DIABETES MELLITUS: ICD-10-CM

## 2019-01-02 RX ORDER — PEN NEEDLE, DIABETIC 31 GX5/16"
NEEDLE, DISPOSABLE MISCELLANEOUS
Qty: 100 EACH | Refills: 0 | Status: SHIPPED | OUTPATIENT
Start: 2019-01-02 | End: 2019-04-19 | Stop reason: SDUPTHER

## 2019-02-05 ENCOUNTER — TELEPHONE (OUTPATIENT)
Dept: ENDOCRINOLOGY | Age: 74
End: 2019-02-05

## 2019-02-05 NOTE — TELEPHONE ENCOUNTER
----- Message from ALECIA Sims sent at 2/5/2019 11:18 AM EST -----  Contact: Loly - Pharmacist   Change to lantus same dose  ----- Message -----  From: Cecilia Washington MA  Sent: 2/5/2019  11:05 AM  To: ALECIA Sims    Please advise. Pt was seen in October and has an appt in April  ----- Message -----  From: Magaly Jimenez  Sent: 2/5/2019  10:50 AM  To: URBANO Tompkins with [facility] ph. [phone] said in mid December she left generic voicemail for  Patient  Advising one of her medications was now Tier 3 eff. 1--1-19 and left voicemail on  [phone] that is attached to [facility]'s NPI on website.    She said Levemir is a tier 3 effective 1-1-19 and cost for 90 day supply has went from $20 to $200.   She said Lantus is a Tier 2 alternative and asked for script to be sent to [facility] mail order for 90 days.

## 2019-02-18 RX ORDER — SIMVASTATIN 20 MG
TABLET ORAL
Qty: 90 TABLET | Refills: 0 | Status: SHIPPED | OUTPATIENT
Start: 2019-02-18 | End: 2019-05-18 | Stop reason: SDUPTHER

## 2019-03-17 RX ORDER — ALLOPURINOL 100 MG/1
TABLET ORAL
Qty: 90 TABLET | Refills: 3 | Status: CANCELLED | OUTPATIENT
Start: 2019-03-17

## 2019-03-18 RX ORDER — ALLOPURINOL 100 MG/1
100 TABLET ORAL DAILY
Qty: 90 TABLET | Refills: 0 | Status: SHIPPED | OUTPATIENT
Start: 2019-03-18 | End: 2019-06-16 | Stop reason: SDUPTHER

## 2019-03-19 DIAGNOSIS — E11.9 TYPE 2 DIABETES MELLITUS WITHOUT COMPLICATION, UNSPECIFIED WHETHER LONG TERM INSULIN USE (HCC): ICD-10-CM

## 2019-03-19 DIAGNOSIS — E79.0 HYPERURICEMIA: ICD-10-CM

## 2019-03-19 DIAGNOSIS — E55.9 VITAMIN D DEFICIENCY: Primary | ICD-10-CM

## 2019-03-25 ENCOUNTER — LAB (OUTPATIENT)
Dept: ENDOCRINOLOGY | Age: 74
End: 2019-03-25

## 2019-03-25 DIAGNOSIS — E55.9 VITAMIN D DEFICIENCY: ICD-10-CM

## 2019-03-25 DIAGNOSIS — E79.0 HYPERURICEMIA: ICD-10-CM

## 2019-03-25 DIAGNOSIS — E11.9 TYPE 2 DIABETES MELLITUS WITHOUT COMPLICATION, UNSPECIFIED WHETHER LONG TERM INSULIN USE (HCC): ICD-10-CM

## 2019-03-26 LAB
25(OH)D3+25(OH)D2 SERPL-MCNC: 43.1 NG/ML (ref 30–100)
ALBUMIN SERPL-MCNC: 3.7 G/DL (ref 3.5–5.2)
ALBUMIN/GLOB SERPL: 1.5 G/DL
ALP SERPL-CCNC: 68 U/L (ref 39–117)
ALT SERPL-CCNC: 15 U/L (ref 1–33)
AST SERPL-CCNC: 27 U/L (ref 1–32)
BILIRUB SERPL-MCNC: 0.7 MG/DL (ref 0.2–1.2)
BUN SERPL-MCNC: 23 MG/DL (ref 8–23)
BUN/CREAT SERPL: 16.7 (ref 7–25)
C PEPTIDE SERPL-MCNC: 2.4 NG/ML (ref 1.1–4.4)
CALCIUM SERPL-MCNC: 9.2 MG/DL (ref 8.6–10.5)
CHLORIDE SERPL-SCNC: 104 MMOL/L (ref 98–107)
CHOLEST SERPL-MCNC: 176 MG/DL (ref 0–200)
CO2 SERPL-SCNC: 27.3 MMOL/L (ref 22–29)
CREAT SERPL-MCNC: 1.38 MG/DL (ref 0.57–1)
GLOBULIN SER CALC-MCNC: 2.4 GM/DL
GLUCOSE SERPL-MCNC: 165 MG/DL (ref 65–99)
HBA1C MFR BLD: 7.28 % (ref 4.8–5.6)
HDLC SERPL-MCNC: 45 MG/DL (ref 40–60)
INTERPRETATION: NORMAL
LDLC SERPL CALC-MCNC: 104 MG/DL (ref 0–100)
Lab: NORMAL
POTASSIUM SERPL-SCNC: 5.3 MMOL/L (ref 3.5–5.2)
PROT SERPL-MCNC: 6.1 G/DL (ref 6–8.5)
SODIUM SERPL-SCNC: 143 MMOL/L (ref 136–145)
TRIGL SERPL-MCNC: 133 MG/DL (ref 0–150)
URATE SERPL-MCNC: 4.7 MG/DL (ref 2.4–5.7)
VLDLC SERPL CALC-MCNC: 26.6 MG/DL (ref 5–40)

## 2019-04-05 DIAGNOSIS — IMO0001 UNCONTROLLED DIABETES MELLITUS TYPE 2 WITHOUT COMPLICATIONS: ICD-10-CM

## 2019-04-05 DIAGNOSIS — E55.9 VITAMIN D DEFICIENCY: ICD-10-CM

## 2019-04-05 DIAGNOSIS — N28.9 RENAL INSUFFICIENCY: ICD-10-CM

## 2019-04-05 DIAGNOSIS — E78.5 DYSLIPIDEMIA: ICD-10-CM

## 2019-04-05 RX ORDER — METOPROLOL TARTRATE 50 MG/1
50 TABLET, FILM COATED ORAL DAILY
Qty: 90 TABLET | Refills: 0 | Status: SHIPPED | OUTPATIENT
Start: 2019-04-05 | End: 2019-07-01 | Stop reason: SDUPTHER

## 2019-04-05 RX ORDER — METOPROLOL TARTRATE 50 MG/1
TABLET, FILM COATED ORAL
Qty: 90 TABLET | Refills: 1 | Status: CANCELLED | OUTPATIENT
Start: 2019-04-05

## 2019-04-08 ENCOUNTER — OFFICE VISIT (OUTPATIENT)
Dept: ENDOCRINOLOGY | Age: 74
End: 2019-04-08

## 2019-04-08 VITALS
DIASTOLIC BLOOD PRESSURE: 82 MMHG | SYSTOLIC BLOOD PRESSURE: 132 MMHG | WEIGHT: 210 LBS | HEIGHT: 64 IN | BODY MASS INDEX: 35.85 KG/M2

## 2019-04-08 DIAGNOSIS — E79.0 HYPERURICEMIA: ICD-10-CM

## 2019-04-08 DIAGNOSIS — E11.65 UNCONTROLLED TYPE 2 DIABETES MELLITUS WITH HYPERGLYCEMIA (HCC): Primary | ICD-10-CM

## 2019-04-08 DIAGNOSIS — I10 ESSENTIAL HYPERTENSION: ICD-10-CM

## 2019-04-08 DIAGNOSIS — E78.5 DYSLIPIDEMIA: ICD-10-CM

## 2019-04-08 DIAGNOSIS — E55.9 VITAMIN D DEFICIENCY: ICD-10-CM

## 2019-04-08 DIAGNOSIS — E03.9 PRIMARY HYPOTHYROIDISM: ICD-10-CM

## 2019-04-08 PROCEDURE — 99214 OFFICE O/P EST MOD 30 MIN: CPT | Performed by: NURSE PRACTITIONER

## 2019-04-08 RX ORDER — BLOOD SUGAR DIAGNOSTIC
STRIP MISCELLANEOUS
Qty: 100 EACH | Refills: 2 | Status: SHIPPED | OUTPATIENT
Start: 2019-04-08 | End: 2019-10-09

## 2019-04-08 RX ORDER — BLOOD-GLUCOSE METER
KIT MISCELLANEOUS
Qty: 1 EACH | Refills: 1 | Status: SHIPPED | OUTPATIENT
Start: 2019-04-08

## 2019-04-08 NOTE — PATIENT INSTRUCTIONS
Increase levemir to 27 units once daily. Goal for morning bs is less than 120 mg/dl  Contact office if morning blood sugars are staying greater than 120 mg/dl

## 2019-04-08 NOTE — PROGRESS NOTES
"Subjective   Rachel Albrecht is a 74 y.o. female is here today for follow-up.  Chief Complaint   Patient presents with   • Diabetes     recent labs, testing BG 1 time daily, pt did not bring meter   • Hyperlipidemia   • Vitamin D Deficiency   • hyperuriemia     /82   Ht 162.6 cm (64\")   Wt 95.3 kg (210 lb)   BMI 36.05 kg/m²   Current Outpatient Medications on File Prior to Visit   Medication Sig   • allopurinol (ZYLOPRIM) 100 MG tablet Take 1 tablet by mouth Daily.   • B-D ULTRAFINE III SHORT PEN 31G X 8 MM misc USE FOR INSULIN INJECTIONS DAILY AS DIRECTED   • Cholecalciferol (VITAMIN D3) 2000 UNITS tablet Take 2 tablets by mouth daily.   • febuxostat (ULORIC) 80 MG tablet tablet Take 80 mg by mouth Daily.   • glucagon (GLUCAGEN) 1 MG injection Inject 1 mg under the skin See Admin Instructions. Follow package directions for low blood sugar.   • Insulin Detemir (LEVEMIR FLEXPEN SC) Inject 27 Units under the skin into the appropriate area as directed Daily.   • JANUVIA 50 MG tablet TAKE 1 TABLET DAILY   • levothyroxine (SYNTHROID) 75 MCG tablet Take 1 tablet by mouth Daily. On empty stomach   • metoprolol tartrate (LOPRESSOR) 50 MG tablet Take 1 tablet by mouth Daily.   • Multiple Vitamins-Minerals (PRESERVISION AREDS PO) Take 1 tablet by mouth daily.   • nateglinide (STARLIX) 60 MG tablet TAKE 1 TABLET THREE TIMES A DAY BEFORE MEALS   • ONE TOUCH LANCETS misc Check blood glucose twice daily   • ONE TOUCH ULTRA TEST test strip TEST BLOOD SUGAR TWO TIMES A DAY AS DIRECTED   • pioglitazone (ACTOS) 45 MG tablet TAKE 1 TABLET DAILY   • simvastatin (ZOCOR) 20 MG tablet TAKE 1 TABLET EVERY NIGHT   • [DISCONTINUED] Blood Glucose Monitoring Suppl (ONE TOUCH ULTRA MINI) W/DEVICE kit Use to test blood sugar twice a day   • [DISCONTINUED] Insulin Glargine (LANTUS SOLOSTAR) 100 UNIT/ML injection pen Inject 25 Units under the skin into the appropriate area as directed Daily.   • [DISCONTINUED] Insulin Pen Needle (B-D " ULTRAFINE III SHORT PEN) 31G X 8 MM misc Use one daily with Levemir injection.     No current facility-administered medications on file prior to visit.      Family History   Problem Relation Age of Onset   • Diabetes Father    • Kidney disease Father    • Heart attack Father    • Diabetes Brother    • Kidney disease Brother      Social History     Tobacco Use   • Smoking status: Never Smoker   Substance Use Topics   • Alcohol use: No   • Drug use: Not on file     No Known Allergies      History of Present Illness  Encounter Diagnoses   Name Primary?   • Essential hypertension    • Uncontrolled type 2 diabetes mellitus with hyperglycemia (CMS/[facility]) Yes   • Vitamin D deficiency    • Dyslipidemia    • Hyperuricemia    • Primary hypothyroidism      74-year-old female patient here today for routine follow-up visit.  She has been seen for the above-mentioned problems.  She is accompanied by her .  She did not bring her blood glucose meter with her to today's visit.  She checks her blood sugars 1 time daily.  She states her blood sugars are typically in the 150 range or above when she wakes up in the morning.  She had recent labs which were reviewed and she was provided a copy.  She is requesting a new blood glucose meter today's visit.  She denies any signs and symptoms of hypothyroidism.    She follows up with the eye doctor routinely.  She has a history of macular degeneration.  She has a history of hypothyroidism however the diagnosis was not listed on her medical record and labs were not done at today's visit.  The diagnosis has been added we will continue to monitor.  She is currently on levothyroxine 75 mcg daily.  She denies any signs and symptoms of hypothyroidism.  She denies any hypoglycemic events.  The following portions of the patient's history were reviewed and updated as appropriate: allergies, current medications, past family history, past medical history, past social history, past surgical history  and problem list.    Review of Systems   Constitutional: Negative for fatigue.   HENT: Negative for trouble swallowing.    Eyes: Negative for visual disturbance.   Cardiovascular: Negative for leg swelling.   Endocrine: Negative for polyuria.   Skin: Negative for wound.   Neurological: Negative for numbness.       Objective   Physical Exam   Constitutional: She is oriented to person, place, and time. She appears well-developed and well-nourished. No distress.   Flat affect   HENT:   Head: Normocephalic and atraumatic.   Right Ear: External ear normal.   Left Ear: External ear normal.   Nose: Nose normal.   Mouth/Throat: Oropharynx is clear and moist. No oropharyngeal exudate.   Eyes: EOM are normal. Pupils are equal, round, and reactive to light. Right eye exhibits no discharge. Left eye exhibits no discharge.   Neck: Trachea normal, normal range of motion and full passive range of motion without pain. Neck supple. No tracheal tenderness present. Carotid bruit is not present. No tracheal deviation, no edema and no erythema present. No thyroid mass and no thyromegaly present.   Cardiovascular: Normal rate, regular rhythm, normal heart sounds and intact distal pulses. Exam reveals no gallop and no friction rub.   No murmur heard.  Pulmonary/Chest: Effort normal and breath sounds normal. No stridor. No respiratory distress. She has no wheezes. She has no rales.   Abdominal: Soft. Bowel sounds are normal. She exhibits no distension.   Musculoskeletal: Normal range of motion. She exhibits edema. She exhibits no deformity.   2 + pitting BLE   Lymphadenopathy:     She has no cervical adenopathy.   Neurological: She is alert and oriented to person, place, and time.   Skin: Skin is warm and dry. No rash noted. She is not diaphoretic. No erythema. No pallor.   Psychiatric: She has a normal mood and affect. Her behavior is normal. Judgment and thought content normal.   Nursing note and vitals reviewed.    Results for orders  placed or performed in visit on 03/25/19   C-Peptide   Result Value Ref Range    C-Peptide 2.4 1.1 - 4.4 ng/mL   Hemoglobin A1c   Result Value Ref Range    Hemoglobin A1C 7.28 (H) 4.80 - 5.60 %   Vitamin D 25 Hydroxy   Result Value Ref Range    25 Hydroxy, Vitamin D 43.1 30.0 - 100.0 ng/ml   Lipid Panel   Result Value Ref Range    Total Cholesterol 176 0 - 200 mg/dL    Triglycerides 133 0 - 150 mg/dL    HDL Cholesterol 45 40 - 60 mg/dL    VLDL Cholesterol 26.6 5 - 40 mg/dL    LDL Cholesterol  104 (H) 0 - 100 mg/dL   Comprehensive Metabolic Panel   Result Value Ref Range    Glucose 165 (H) 65 - 99 mg/dL    BUN 23 8 - 23 mg/dL    Creatinine 1.38 (H) 0.57 - 1.00 mg/dL    eGFR Non African Am 37 (L) >60 mL/min/1.73    eGFR African Am 45 (L) >60 mL/min/1.73    BUN/Creatinine Ratio 16.7 7.0 - 25.0    Sodium 143 136 - 145 mmol/L    Potassium 5.3 (H) 3.5 - 5.2 mmol/L    Chloride 104 98 - 107 mmol/L    Total CO2 27.3 22.0 - 29.0 mmol/L    Calcium 9.2 8.6 - 10.5 mg/dL    Total Protein 6.1 6.0 - 8.5 g/dL    Albumin 3.70 3.50 - 5.20 g/dL    Globulin 2.4 gm/dL    A/G Ratio 1.5 g/dL    Total Bilirubin 0.7 0.2 - 1.2 mg/dL    Alkaline Phosphatase 68 39 - 117 U/L    AST (SGOT) 27 1 - 32 U/L    ALT (SGPT) 15 1 - 33 U/L   Uric Acid   Result Value Ref Range    Uric Acid 4.7 2.4 - 5.7 mg/dL   Cardiovascular Risk Assessment   Result Value Ref Range    Interpretation Note    Diabetes Patient Education   Result Value Ref Range    PDF Image Not applicable          Assessment/Plan   Problems Addressed this Visit        Cardiovascular and Mediastinum    Hypertension       Digestive    Vitamin D deficiency       Endocrine    Uncontrolled type 2 diabetes mellitus (CMS/HCC) - Primary    Relevant Medications    Insulin Detemir (LEVEMIR FLEXPEN SC)    Primary hypothyroidism       Other    Dyslipidemia    Hyperuricemia          In summary, patient was seen and examined.  She will follow-up in 6 months with labs prior.  I have encouraged her to  contact office should she have any questions or concerns prior to her next visit.  I have increased her Levemir to 27 units once daily.  Goal for morning blood sugars is less than 120.  I have advised patient to contact the office if her blood sugars fail to improve.  She was given a new blood glucose meter at today's visit.  She has had no hypoglycemic events.  She denies any signs and symptoms of hypothyroidism.  Metabolically she is stable.  Her hemoglobin A1c is slightly above goal of 7 however due to her risk of hypoglycemia and her age this is in satisfactory range.  I have asked that she contact the office if her blood sugars continue to stay too high in the mornings.  She has been advised to bring her blood glucose meter with her to each visit.

## 2019-04-19 DIAGNOSIS — IMO0002 UNCONTROLLED TYPE 2 DIABETES MELLITUS: ICD-10-CM

## 2019-04-19 DIAGNOSIS — E55.9 VITAMIN D DEFICIENCY: ICD-10-CM

## 2019-04-19 DIAGNOSIS — E78.5 DYSLIPIDEMIA: ICD-10-CM

## 2019-04-19 DIAGNOSIS — N28.9 RENAL INSUFFICIENCY: ICD-10-CM

## 2019-04-19 RX ORDER — PEN NEEDLE, DIABETIC 31 GX5/16"
NEEDLE, DISPOSABLE MISCELLANEOUS
Qty: 100 EACH | Refills: 0 | Status: SHIPPED | OUTPATIENT
Start: 2019-04-19 | End: 2019-10-26 | Stop reason: SDUPTHER

## 2019-05-18 DIAGNOSIS — E78.5 DYSLIPIDEMIA: ICD-10-CM

## 2019-05-18 DIAGNOSIS — E55.9 VITAMIN D DEFICIENCY: ICD-10-CM

## 2019-05-18 DIAGNOSIS — N28.9 RENAL INSUFFICIENCY: ICD-10-CM

## 2019-05-20 RX ORDER — SITAGLIPTIN 50 MG/1
TABLET, FILM COATED ORAL
Qty: 90 TABLET | Refills: 2 | Status: SHIPPED | OUTPATIENT
Start: 2019-05-20 | End: 2020-02-28

## 2019-05-20 RX ORDER — SIMVASTATIN 20 MG
TABLET ORAL
Qty: 90 TABLET | Refills: 0 | Status: SHIPPED | OUTPATIENT
Start: 2019-05-20 | End: 2019-08-22 | Stop reason: SDUPTHER

## 2019-06-05 RX ORDER — PIOGLITAZONEHYDROCHLORIDE 45 MG/1
TABLET ORAL
Qty: 90 TABLET | Refills: 1 | Status: SHIPPED | OUTPATIENT
Start: 2019-06-05 | End: 2019-12-30

## 2019-06-17 RX ORDER — ALLOPURINOL 100 MG/1
TABLET ORAL
Qty: 90 TABLET | Refills: 0 | Status: SHIPPED | OUTPATIENT
Start: 2019-06-17 | End: 2019-09-16 | Stop reason: SDUPTHER

## 2019-07-01 DIAGNOSIS — IMO0001 UNCONTROLLED DIABETES MELLITUS TYPE 2 WITHOUT COMPLICATIONS: ICD-10-CM

## 2019-07-01 DIAGNOSIS — E55.9 VITAMIN D DEFICIENCY: ICD-10-CM

## 2019-07-01 DIAGNOSIS — E78.5 DYSLIPIDEMIA: ICD-10-CM

## 2019-07-01 DIAGNOSIS — N28.9 RENAL INSUFFICIENCY: ICD-10-CM

## 2019-07-01 RX ORDER — METOPROLOL TARTRATE 50 MG/1
TABLET, FILM COATED ORAL
Qty: 90 TABLET | Refills: 0 | Status: SHIPPED | OUTPATIENT
Start: 2019-07-01 | End: 2019-10-09 | Stop reason: SDUPTHER

## 2019-07-31 DIAGNOSIS — E16.2 HYPOGLYCEMIA: ICD-10-CM

## 2019-07-31 DIAGNOSIS — E66.9 CLASS 1 OBESITY: ICD-10-CM

## 2019-07-31 DIAGNOSIS — I10 ESSENTIAL HYPERTENSION: ICD-10-CM

## 2019-07-31 DIAGNOSIS — E55.9 VITAMIN D DEFICIENCY: ICD-10-CM

## 2019-07-31 DIAGNOSIS — E79.0 HYPERURICEMIA: ICD-10-CM

## 2019-07-31 DIAGNOSIS — E78.5 DYSLIPIDEMIA: ICD-10-CM

## 2019-07-31 DIAGNOSIS — IMO0002 UNCONTROLLED TYPE 2 DIABETES MELLITUS WITH COMPLICATION, WITH LONG-TERM CURRENT USE OF INSULIN: ICD-10-CM

## 2019-07-31 DIAGNOSIS — E03.9 PRIMARY HYPOTHYROIDISM: ICD-10-CM

## 2019-07-31 RX ORDER — LEVOTHYROXINE SODIUM 0.07 MG/1
TABLET ORAL
Qty: 90 TABLET | Refills: 3 | Status: SHIPPED | OUTPATIENT
Start: 2019-07-31 | End: 2020-04-09 | Stop reason: DRUGHIGH

## 2019-08-22 RX ORDER — SIMVASTATIN 20 MG
TABLET ORAL
Qty: 90 TABLET | Refills: 0 | Status: SHIPPED | OUTPATIENT
Start: 2019-08-22 | End: 2019-11-18 | Stop reason: SDUPTHER

## 2019-09-05 ENCOUNTER — TELEPHONE (OUTPATIENT)
Dept: ENDOCRINOLOGY | Age: 74
End: 2019-09-05

## 2019-09-05 NOTE — TELEPHONE ENCOUNTER
Patient has been informed of medicine change and expressed understanding.     ----- Message from ALECIA Sims sent at 9/5/2019 12:10 PM EDT -----  Contact: patient  Stop nateglinide due to low blood sugar, bring meter to her appt in 1 month. If bs's go less than 70 mg/dl she needs to let office know   ----- Message -----  From: Cecilia Washington MA  Sent: 9/5/2019  11:59 AM  To: ALECIA Sims        ----- Message -----  From: Magaly Jimenez  Sent: 9/5/2019  11:18 AM  To: Cecilia Washington MA    Patient said this morning she was unconscious and  found her in the floor about 8am She doesn't think it was for more than 30 minutes. She said her blood sugar was 89 this morning at 7am before breakfast. She said she ate breakfast and the last thing she remembers she was unloading the .     She said her  gave her jelly. After she was unconscious she ate 3 or 4 cookies. She seemed to be ok after that. She said she has not checked her blood sugar since 7am.    When I asked for 7 day and 14 day average blood sugars  she said this week her blood sugars have been in the 80's and last week varied and lowest was 66 and the highest was 90.    Patient said she takes Lantus , 27 units in the morning 1xday and Nateglinide, 60mg before each meal, 3xday. She said she takes other medication but does not know if they are diabetic.   She said she wants to share this information and feels that she should be alright since she has appt on 10-9-19 with Komal.     Pt - [phone]

## 2019-09-15 RX ORDER — ALLOPURINOL 100 MG/1
TABLET ORAL
Qty: 90 TABLET | Refills: 4 | Status: CANCELLED | OUTPATIENT
Start: 2019-09-15

## 2019-09-16 DIAGNOSIS — E79.0 HYPERURICEMIA: ICD-10-CM

## 2019-09-16 DIAGNOSIS — E03.9 PRIMARY HYPOTHYROIDISM: ICD-10-CM

## 2019-09-16 DIAGNOSIS — IMO0002 UNCONTROLLED TYPE 2 DIABETES MELLITUS WITH COMPLICATION, WITH LONG-TERM CURRENT USE OF INSULIN: Primary | ICD-10-CM

## 2019-09-16 DIAGNOSIS — E55.9 VITAMIN D DEFICIENCY: ICD-10-CM

## 2019-09-16 DIAGNOSIS — E78.5 DYSLIPIDEMIA: ICD-10-CM

## 2019-09-16 RX ORDER — ALLOPURINOL 100 MG/1
100 TABLET ORAL DAILY
Qty: 90 TABLET | Refills: 0 | Status: SHIPPED | OUTPATIENT
Start: 2019-09-16 | End: 2020-01-21

## 2019-09-25 ENCOUNTER — LAB (OUTPATIENT)
Dept: ENDOCRINOLOGY | Age: 74
End: 2019-09-25

## 2019-09-25 DIAGNOSIS — E55.9 VITAMIN D DEFICIENCY: ICD-10-CM

## 2019-09-25 DIAGNOSIS — E79.0 HYPERURICEMIA: ICD-10-CM

## 2019-09-25 DIAGNOSIS — E78.5 DYSLIPIDEMIA: ICD-10-CM

## 2019-09-25 DIAGNOSIS — IMO0002 UNCONTROLLED TYPE 2 DIABETES MELLITUS WITH COMPLICATION, WITH LONG-TERM CURRENT USE OF INSULIN: ICD-10-CM

## 2019-09-25 DIAGNOSIS — E03.9 PRIMARY HYPOTHYROIDISM: ICD-10-CM

## 2019-09-27 LAB
25(OH)D3+25(OH)D2 SERPL-MCNC: 41.9 NG/ML (ref 30–100)
ALBUMIN SERPL-MCNC: 3.9 G/DL (ref 3.5–5.2)
ALBUMIN/GLOB SERPL: 1.6 G/DL
ALP SERPL-CCNC: 75 U/L (ref 39–117)
ALT SERPL-CCNC: 9 U/L (ref 1–33)
AST SERPL-CCNC: 19 U/L (ref 1–32)
BILIRUB SERPL-MCNC: 0.7 MG/DL (ref 0.2–1.2)
BUN SERPL-MCNC: 23 MG/DL (ref 8–23)
BUN/CREAT SERPL: 16 (ref 7–25)
C PEPTIDE SERPL-MCNC: 2.1 NG/ML (ref 1.1–4.4)
CALCIUM SERPL-MCNC: 9.1 MG/DL (ref 8.6–10.5)
CHLORIDE SERPL-SCNC: 105 MMOL/L (ref 98–107)
CHOLEST SERPL-MCNC: 177 MG/DL (ref 0–200)
CO2 SERPL-SCNC: 26.1 MMOL/L (ref 22–29)
CREAT SERPL-MCNC: 1.44 MG/DL (ref 0.57–1)
FT4I SERPL CALC-MCNC: 2.3 (ref 1.2–4.9)
GLOBULIN SER CALC-MCNC: 2.5 GM/DL
GLUCOSE SERPL-MCNC: 138 MG/DL (ref 65–99)
HBA1C MFR BLD: 6.8 % (ref 4.8–5.6)
HDLC SERPL-MCNC: 46 MG/DL (ref 40–60)
INTERPRETATION: NORMAL
LDLC SERPL CALC-MCNC: 106 MG/DL (ref 0–100)
Lab: NORMAL
MICROALBUMIN UR-MCNC: 16.6 UG/ML
POTASSIUM SERPL-SCNC: 5.4 MMOL/L (ref 3.5–5.2)
PROT SERPL-MCNC: 6.4 G/DL (ref 6–8.5)
SODIUM SERPL-SCNC: 141 MMOL/L (ref 136–145)
T3FREE SERPL-MCNC: 2.3 PG/ML (ref 2–4.4)
T3RU NFR SERPL: 29 % (ref 24–39)
T4 FREE SERPL-MCNC: 1.33 NG/DL (ref 0.93–1.7)
T4 SERPL-MCNC: 7.8 UG/DL (ref 4.5–12)
THYROGLOB AB SERPL-ACNC: <1 IU/ML
THYROGLOB SERPL-MCNC: 22.1 NG/ML
THYROGLOB SERPL-MCNC: NORMAL NG/ML
TRIGL SERPL-MCNC: 124 MG/DL (ref 0–150)
TSH SERPL DL<=0.005 MIU/L-ACNC: 2.86 UIU/ML (ref 0.45–4.5)
URATE SERPL-MCNC: 4.7 MG/DL (ref 2.4–5.7)
VLDLC SERPL CALC-MCNC: 24.8 MG/DL

## 2019-10-09 ENCOUNTER — OFFICE VISIT (OUTPATIENT)
Dept: ENDOCRINOLOGY | Age: 74
End: 2019-10-09

## 2019-10-09 VITALS
HEIGHT: 64 IN | WEIGHT: 218 LBS | DIASTOLIC BLOOD PRESSURE: 76 MMHG | BODY MASS INDEX: 37.22 KG/M2 | SYSTOLIC BLOOD PRESSURE: 134 MMHG

## 2019-10-09 DIAGNOSIS — IMO0001 UNCONTROLLED DIABETES MELLITUS TYPE 2 WITHOUT COMPLICATIONS: ICD-10-CM

## 2019-10-09 DIAGNOSIS — E03.9 PRIMARY HYPOTHYROIDISM: ICD-10-CM

## 2019-10-09 DIAGNOSIS — E55.9 VITAMIN D DEFICIENCY: ICD-10-CM

## 2019-10-09 DIAGNOSIS — E78.5 DYSLIPIDEMIA: ICD-10-CM

## 2019-10-09 DIAGNOSIS — E16.2 HYPOGLYCEMIA: ICD-10-CM

## 2019-10-09 DIAGNOSIS — E11.69 TYPE 2 DIABETES MELLITUS WITH OTHER SPECIFIED COMPLICATION, UNSPECIFIED WHETHER LONG TERM INSULIN USE (HCC): Primary | ICD-10-CM

## 2019-10-09 DIAGNOSIS — E79.0 HYPERURICEMIA: ICD-10-CM

## 2019-10-09 DIAGNOSIS — N28.9 RENAL INSUFFICIENCY: ICD-10-CM

## 2019-10-09 PROCEDURE — 99214 OFFICE O/P EST MOD 30 MIN: CPT | Performed by: NURSE PRACTITIONER

## 2019-10-09 RX ORDER — METOPROLOL TARTRATE 50 MG/1
TABLET, FILM COATED ORAL
Qty: 90 TABLET | Refills: 4 | OUTPATIENT
Start: 2019-10-09

## 2019-10-09 RX ORDER — METOPROLOL TARTRATE 50 MG/1
50 TABLET, FILM COATED ORAL DAILY
Qty: 90 TABLET | Refills: 2 | Status: SHIPPED | OUTPATIENT
Start: 2019-10-09 | End: 2020-07-21

## 2019-10-09 NOTE — PROGRESS NOTES
"Subjective   Rachel Albrecht is a 74 y.o. female is here today for follow-up.  Chief Complaint   Patient presents with   • Diabetes     recent labs, testing BG 1 time daily, pt brought meter   • Hyperlipidemia   • Hypertension   • Hypothyroidism   • Vitamin D Deficiency   • hyperuricemia     /76   Ht 162.6 cm (64\")   Wt 98.9 kg (218 lb)   BMI 37.42 kg/m²   Current Outpatient Medications on File Prior to Visit   Medication Sig   • allopurinol (ZYLOPRIM) 100 MG tablet Take 1 tablet by mouth Daily.   • B-D ULTRAFINE III SHORT PEN 31G X 8 MM misc USE DAILY AS DIRECTED   • Blood Glucose Monitoring Suppl (ONE TOUCH ULTRA MINI) w/Device kit Use to test blood sugar twice a day   • Cholecalciferol (VITAMIN D3) 2000 UNITS tablet Take 2 tablets by mouth daily.   • febuxostat (ULORIC) 80 MG tablet tablet Take 80 mg by mouth Daily.   • Insulin Detemir (LEVEMIR FLEXPEN SC) Inject 27 Units under the skin into the appropriate area as directed Daily.   • JANUVIA 50 MG tablet TAKE 1 TABLET DAILY   • levothyroxine (SYNTHROID, LEVOTHROID) 75 MCG tablet TAKE 1 TABLET DAILY ON AN EMPTY STOMACH   • metoprolol tartrate (LOPRESSOR) 50 MG tablet TAKE 1 TABLET DAILY   • Multiple Vitamins-Minerals (PRESERVISION AREDS PO) Take 1 tablet by mouth daily.   • ONE TOUCH LANCETS misc Check blood glucose twice daily   • ONE TOUCH ULTRA TEST test strip TEST BLOOD SUGAR TWO TIMES A DAY AS DIRECTED   • pioglitazone (ACTOS) 45 MG tablet TAKE 1 TABLET DAILY   • simvastatin (ZOCOR) 20 MG tablet TAKE 1 TABLET EVERY NIGHT   • [DISCONTINUED] ONETOUCH VERIO test strip Use as instructed daily     No current facility-administered medications on file prior to visit.      Family History   Problem Relation Age of Onset   • Diabetes Father    • Kidney disease Father    • Heart attack Father    • Diabetes Brother    • Kidney disease Brother      Social History     Tobacco Use   • Smoking status: Never Smoker   Substance Use Topics   • Alcohol use: No   • Drug " use: Not on file     No Known Allergies      History of Present Illness   Encounter Diagnoses   Name Primary?   • Dyslipidemia    • Hyperuricemia    • Hypoglycemia    • Primary hypothyroidism    • Renal insufficiency    • Vitamin D deficiency    • Type 2 diabetes mellitus with other specified complication, unspecified whether long term insulin use (CMS/[facility]) Yes       74-year-old female patient here today for a follow-up visit.  She has been seen for the above-mentioned problems.  She had recent labs which were reviewed and she was provided a copy.  She is a coming by her  at today's visit.  She has had one hypoglycemic event in which she was found unconscious on her kitchen floor by her .  She was possibly passed out for as long as 30 minutes.  She remembers unloading the  but that is all she recalls.  She had breakfast that morning of toast and peanut butter and her blood sugar was 89 when she had woke up.  She had taken her morning Levemir however she denies taking her morning nateglinide.  She had been stopped on the nateglinide after reporting her low blood sugar to our office.  She is only checking her blood sugars once daily in the morning.  Her morning blood sugars are typically in the 80-1 21 range.  She has had no further hypoglycemic events.  Her medication list was reviewed and updated.  The following portions of the patient's history were reviewed and updated as appropriate: allergies, current medications, past family history, past medical history, past social history, past surgical history and problem list.    Review of Systems   Constitutional: Negative.    HENT: Negative.    Eyes: Negative.    Cardiovascular: Negative.    Endocrine: Negative.    Skin: Negative.        Objective   Physical Exam   Constitutional: She is oriented to person, place, and time. She appears well-developed and well-nourished. No distress.   Flat affect   HENT:   Head: Normocephalic and atraumatic.    Right Ear: External ear normal.   Left Ear: External ear normal.   Nose: Nose normal.   Mouth/Throat: Oropharynx is clear and moist. No oropharyngeal exudate.   Eyes: EOM are normal. Pupils are equal, round, and reactive to light. Right eye exhibits no discharge. Left eye exhibits no discharge.   Neck: Trachea normal, normal range of motion and full passive range of motion without pain. Neck supple. No tracheal tenderness present. Carotid bruit is not present. No tracheal deviation, no edema and no erythema present. No thyroid mass and no thyromegaly present.   Cardiovascular: Normal rate, regular rhythm, normal heart sounds and intact distal pulses. Exam reveals no gallop and no friction rub.   No murmur heard.  Pulmonary/Chest: Effort normal and breath sounds normal. No stridor. No respiratory distress. She has no wheezes. She has no rales.   Abdominal: Soft. Bowel sounds are normal. She exhibits no distension.   Musculoskeletal: Normal range of motion. She exhibits edema. She exhibits no deformity.   2 + pitting BLE   Lymphadenopathy:     She has no cervical adenopathy.   Neurological: She is alert and oriented to person, place, and time.   Skin: Skin is warm and dry. No rash noted. She is not diaphoretic. No erythema. No pallor.   Psychiatric: She has a normal mood and affect. Her behavior is normal. Judgment and thought content normal.   Nursing note and vitals reviewed.    Results for orders placed or performed in visit on 09/25/19   Uric Acid   Result Value Ref Range    Uric Acid 4.7 2.4 - 5.7 mg/dL   Comprehensive Thyroglobulin   Result Value Ref Range    Thyroglobulin Ab <1.0 IU/mL    Thyroglobulin 22.1 ng/mL    Thyroglobulin (TG-ELISABETH) Comment ng/mL   Thyroid Panel With TSH   Result Value Ref Range    TSH 2.860 0.450 - 4.500 uIU/mL    T4, Total 7.8 4.5 - 12.0 ug/dL    T3 Uptake 29 24 - 39 %    Free Thyroxine Index 2.3 1.2 - 4.9   T4, Free   Result Value Ref Range    Free T4 1.33 0.93 - 1.70 ng/dL   T3,  Free   Result Value Ref Range    T3, Free 2.3 2.0 - 4.4 pg/mL   C-Peptide   Result Value Ref Range    C-Peptide 2.1 1.1 - 4.4 ng/mL   Hemoglobin A1c   Result Value Ref Range    Hemoglobin A1C 6.80 (H) 4.80 - 5.60 %   Vitamin D 25 Hydroxy   Result Value Ref Range    25 Hydroxy, Vitamin D 41.9 30.0 - 100.0 ng/ml   MicroAlbumin, Urine, Random - Urine, Clean Catch   Result Value Ref Range    Microalbumin, Urine 16.6 Not Estab. ug/mL   Lipid Panel   Result Value Ref Range    Total Cholesterol 177 0 - 200 mg/dL    Triglycerides 124 0 - 150 mg/dL    HDL Cholesterol 46 40 - 60 mg/dL    VLDL Cholesterol 24.8 mg/dL    LDL Cholesterol  106 (H) 0 - 100 mg/dL   Comprehensive Metabolic Panel   Result Value Ref Range    Glucose 138 (H) 65 - 99 mg/dL    BUN 23 8 - 23 mg/dL    Creatinine 1.44 (H) 0.57 - 1.00 mg/dL    eGFR Non African Am 35 (L) >60 mL/min/1.73    eGFR African Am 43 (L) >60 mL/min/1.73    BUN/Creatinine Ratio 16.0 7.0 - 25.0    Sodium 141 136 - 145 mmol/L    Potassium 5.4 (H) 3.5 - 5.2 mmol/L    Chloride 105 98 - 107 mmol/L    Total CO2 26.1 22.0 - 29.0 mmol/L    Calcium 9.1 8.6 - 10.5 mg/dL    Total Protein 6.4 6.0 - 8.5 g/dL    Albumin 3.90 3.50 - 5.20 g/dL    Globulin 2.5 gm/dL    A/G Ratio 1.6 g/dL    Total Bilirubin 0.7 0.2 - 1.2 mg/dL    Alkaline Phosphatase 75 39 - 117 U/L    AST (SGOT) 19 1 - 32 U/L    ALT (SGPT) 9 1 - 33 U/L   Cardiovascular Risk Assessment   Result Value Ref Range    Interpretation Note    Diabetes Patient Education   Result Value Ref Range    PDF Image Not applicable           Assessment/Plan   Problems Addressed this Visit        Digestive    Vitamin D deficiency       Endocrine    Hypoglycemia    Type 2 diabetes mellitus (CMS/HCC) - Primary    Primary hypothyroidism       Genitourinary    Renal insufficiency       Other    Dyslipidemia    Hyperuricemia        Patient was seen and examined.  She was stopped on nateglinide several weeks ago when she had a hypoglycemic event.  We will not  add this medication back until we see how her blood sugars continue to do.  She will continue on her Levemir 27 units once daily.  The rest of her medications were reviewed and updated.  She will continue on all her other medications as prescribed.  Blood pressures in satisfactory range.  She has no other complaints at today's visit.  She has been cautioned to monitor for hypoglycemia and to report to the office if it should occur again.   she was given a new meter at today's visit.  Her blood pressure is in satisfactory range.  Cholesterol is stable.  Vitamin D is in satisfactory range.  Thyroid hormones are stable.  She will follow-up in 6 months with labs prior.  She is been encouraged to check her blood sugars twice daily and let the office know if her blood sugars start trending higher.

## 2019-10-26 DIAGNOSIS — E78.5 DYSLIPIDEMIA: ICD-10-CM

## 2019-10-26 DIAGNOSIS — IMO0002 UNCONTROLLED TYPE 2 DIABETES MELLITUS: ICD-10-CM

## 2019-10-26 DIAGNOSIS — E55.9 VITAMIN D DEFICIENCY: ICD-10-CM

## 2019-10-26 DIAGNOSIS — N28.9 RENAL INSUFFICIENCY: ICD-10-CM

## 2019-10-28 RX ORDER — PEN NEEDLE, DIABETIC 31 GX5/16"
NEEDLE, DISPOSABLE MISCELLANEOUS
Qty: 100 EACH | Refills: 0 | Status: SHIPPED | OUTPATIENT
Start: 2019-10-28 | End: 2020-02-10

## 2019-10-29 RX ORDER — INSULIN GLARGINE 100 [IU]/ML
INJECTION, SOLUTION SUBCUTANEOUS
Qty: 45 ML | Refills: 2 | Status: SHIPPED | OUTPATIENT
Start: 2019-10-29 | End: 2020-10-20

## 2019-11-18 RX ORDER — SIMVASTATIN 20 MG
TABLET ORAL
Qty: 90 TABLET | Refills: 1 | Status: SHIPPED | OUTPATIENT
Start: 2019-11-18 | End: 2020-05-26

## 2019-12-30 RX ORDER — PIOGLITAZONEHYDROCHLORIDE 45 MG/1
TABLET ORAL
Qty: 90 TABLET | Refills: 4 | Status: SHIPPED | OUTPATIENT
Start: 2019-12-30 | End: 2020-12-31

## 2020-01-21 RX ORDER — ALLOPURINOL 100 MG/1
TABLET ORAL
Qty: 90 TABLET | Refills: 0 | Status: SHIPPED | OUTPATIENT
Start: 2020-01-21 | End: 2020-04-17

## 2020-02-08 DIAGNOSIS — E55.9 VITAMIN D DEFICIENCY: ICD-10-CM

## 2020-02-08 DIAGNOSIS — E78.5 DYSLIPIDEMIA: ICD-10-CM

## 2020-02-08 DIAGNOSIS — N28.9 RENAL INSUFFICIENCY: ICD-10-CM

## 2020-02-08 DIAGNOSIS — IMO0002 UNCONTROLLED TYPE 2 DIABETES MELLITUS: ICD-10-CM

## 2020-02-10 RX ORDER — PEN NEEDLE, DIABETIC 31 GX5/16"
NEEDLE, DISPOSABLE MISCELLANEOUS
Qty: 100 EACH | Refills: 0 | Status: SHIPPED | OUTPATIENT
Start: 2020-02-10 | End: 2020-05-21

## 2020-02-28 DIAGNOSIS — E78.5 DYSLIPIDEMIA: ICD-10-CM

## 2020-02-28 DIAGNOSIS — E55.9 VITAMIN D DEFICIENCY: ICD-10-CM

## 2020-02-28 DIAGNOSIS — N28.9 RENAL INSUFFICIENCY: ICD-10-CM

## 2020-02-28 RX ORDER — SITAGLIPTIN 50 MG/1
TABLET, FILM COATED ORAL
Qty: 90 TABLET | Refills: 0 | Status: SHIPPED | OUTPATIENT
Start: 2020-02-28 | End: 2020-04-24

## 2020-03-18 ENCOUNTER — TELEPHONE (OUTPATIENT)
Dept: ENDOCRINOLOGY | Age: 75
End: 2020-03-18

## 2020-03-20 LAB
25(OH)D3+25(OH)D2 SERPL-MCNC: 46.1 NG/ML (ref 30–100)
ALBUMIN SERPL-MCNC: 3.7 G/DL (ref 3.5–5.2)
ALBUMIN/GLOB SERPL: 1.5 G/DL
ALP SERPL-CCNC: 71 U/L (ref 39–117)
ALT SERPL-CCNC: 11 U/L (ref 1–33)
AST SERPL-CCNC: 19 U/L (ref 1–32)
BILIRUB SERPL-MCNC: 0.8 MG/DL (ref 0.2–1.2)
BUN SERPL-MCNC: 21 MG/DL (ref 8–23)
BUN/CREAT SERPL: 14.6 (ref 7–25)
C PEPTIDE SERPL-MCNC: 0.6 NG/ML (ref 1.1–4.4)
CALCIUM SERPL-MCNC: 9 MG/DL (ref 8.6–10.5)
CHLORIDE SERPL-SCNC: 105 MMOL/L (ref 98–107)
CHOLEST SERPL-MCNC: 173 MG/DL (ref 0–200)
CO2 SERPL-SCNC: 28 MMOL/L (ref 22–29)
CREAT SERPL-MCNC: 1.44 MG/DL (ref 0.57–1)
GLOBULIN SER CALC-MCNC: 2.5 GM/DL
GLUCOSE SERPL-MCNC: 84 MG/DL (ref 65–99)
HBA1C MFR BLD: 6.7 % (ref 4.8–5.6)
HDLC SERPL-MCNC: 53 MG/DL (ref 40–60)
INTERPRETATION: NORMAL
LDLC SERPL CALC-MCNC: 103 MG/DL (ref 0–100)
Lab: NORMAL
MICROALBUMIN UR-MCNC: 135 UG/ML
POTASSIUM SERPL-SCNC: 5.3 MMOL/L (ref 3.5–5.2)
PROT SERPL-MCNC: 6.2 G/DL (ref 6–8.5)
SODIUM SERPL-SCNC: 143 MMOL/L (ref 136–145)
T3FREE SERPL-MCNC: 2.1 PG/ML (ref 2–4.4)
T4 FREE SERPL-MCNC: 1.39 NG/DL (ref 0.93–1.7)
T4 SERPL-MCNC: 8.26 MCG/DL (ref 4.5–11.7)
THYROGLOB AB SERPL-ACNC: <1 IU/ML (ref 0–0.9)
THYROGLOB SERPL-MCNC: 34.8 NG/ML (ref 1.5–38.5)
TRIGL SERPL-MCNC: 85 MG/DL (ref 0–150)
TSH SERPL DL<=0.005 MIU/L-ACNC: 5.04 UIU/ML (ref 0.27–4.2)
URATE SERPL-MCNC: 4.6 MG/DL (ref 2.4–5.7)
VLDLC SERPL CALC-MCNC: 17 MG/DL

## 2020-04-09 ENCOUNTER — OFFICE VISIT (OUTPATIENT)
Dept: ENDOCRINOLOGY | Age: 75
End: 2020-04-09

## 2020-04-09 DIAGNOSIS — E03.9 PRIMARY HYPOTHYROIDISM: ICD-10-CM

## 2020-04-09 DIAGNOSIS — E16.2 HYPOGLYCEMIA: ICD-10-CM

## 2020-04-09 DIAGNOSIS — E55.9 VITAMIN D DEFICIENCY: ICD-10-CM

## 2020-04-09 DIAGNOSIS — Z79.4 CONTROLLED TYPE 2 DIABETES MELLITUS WITH COMPLICATION, WITH LONG-TERM CURRENT USE OF INSULIN (HCC): Primary | ICD-10-CM

## 2020-04-09 DIAGNOSIS — I10 ESSENTIAL HYPERTENSION: ICD-10-CM

## 2020-04-09 DIAGNOSIS — E11.8 CONTROLLED TYPE 2 DIABETES MELLITUS WITH COMPLICATION, WITH LONG-TERM CURRENT USE OF INSULIN (HCC): Primary | ICD-10-CM

## 2020-04-09 DIAGNOSIS — E78.5 DYSLIPIDEMIA: ICD-10-CM

## 2020-04-09 DIAGNOSIS — E79.0 HYPERURICEMIA: ICD-10-CM

## 2020-04-09 DIAGNOSIS — IMO0002 UNCONTROLLED TYPE 2 DIABETES MELLITUS WITH COMPLICATION, WITH LONG-TERM CURRENT USE OF INSULIN: ICD-10-CM

## 2020-04-09 DIAGNOSIS — E66.9 CLASS 1 OBESITY: ICD-10-CM

## 2020-04-09 PROCEDURE — 99214 OFFICE O/P EST MOD 30 MIN: CPT | Performed by: NURSE PRACTITIONER

## 2020-04-09 RX ORDER — LEVOTHYROXINE SODIUM 88 UG/1
88 TABLET ORAL DAILY
Qty: 90 TABLET | Refills: 1 | Status: SHIPPED | OUTPATIENT
Start: 2020-04-09 | End: 2021-02-15

## 2020-04-09 NOTE — PATIENT INSTRUCTIONS
Increase levothyroxine to 88 mcg once daily.  Be sure you are taking this medication daily consistently.  Stop taking the 75 mcg dose.  A new prescription has been sent for the 88 mcg  Contact the office should you have any questions or concerns  Continue all other medications the same.

## 2020-04-09 NOTE — PROGRESS NOTES
Subjective   Rachel Albrecht is a 75 y.o. female is here today for follow-up.  Chief Complaint   Patient presents with   • Diabetes     lab review; checking BG once a day fasting in the AM; denies problems or concerns   • Hypothyroidism     , 91, 93, 79     There were no vitals taken for this visit.  Current Outpatient Medications on File Prior to Visit   Medication Sig   • allopurinol (ZYLOPRIM) 100 MG tablet TAKE 1 TABLET DAILY   • B-D ULTRAFINE III SHORT PEN 31G X 8 MM misc USE TO INJECT INSULIN DAILY   • Blood Glucose Monitoring Suppl (ONE TOUCH ULTRA MINI) w/Device kit Use to test blood sugar twice a day   • Cholecalciferol (VITAMIN D3) 2000 UNITS tablet Take 2 tablets by mouth daily.   • febuxostat (ULORIC) 80 MG tablet tablet Take 80 mg by mouth Daily.   • glucose blood (ONETOUCH VERIO) test strip Use to test BG 2 times daily. Dx code: E11.9   • JANUVIA 50 MG tablet TAKE 1 TABLET DAILY   • LANTUS SOLOSTAR 100 UNIT/ML injection pen INJECT 25 UNITS UNDER THE SKIN INTO THE APPROPRIATE AREA AS DIRECTED DAILY   • levothyroxine (SYNTHROID, LEVOTHROID) 75 MCG tablet TAKE 1 TABLET DAILY ON AN EMPTY STOMACH   • metoprolol tartrate (LOPRESSOR) 50 MG tablet Take 1 tablet by mouth Daily.   • Multiple Vitamins-Minerals (PRESERVISION AREDS PO) Take 1 tablet by mouth daily.   • ONE TOUCH LANCETS misc Check blood glucose twice daily   • pioglitazone (ACTOS) 45 MG tablet TAKE 1 TABLET DAILY   • simvastatin (ZOCOR) 20 MG tablet TAKE 1 TABLET EVERY NIGHT     No current facility-administered medications on file prior to visit.      Family History   Problem Relation Age of Onset   • Diabetes Father    • Kidney disease Father    • Heart attack Father    • Diabetes Brother    • Kidney disease Brother      Social History     Tobacco Use   • Smoking status: Never Smoker   Substance Use Topics   • Alcohol use: No   • Drug use: Not on file     No Known Allergies  You have chosen to receive care through a telephone visit today. Do  you consent to use a telephone visit for your medical care today? Yes      History of Present Illness   Encounter Diagnoses   Name Primary?   • Uncontrolled type 2 diabetes mellitus with complication, with long-term current use of insulin (CMS/[facility])    • Essential hypertension    • Vitamin D deficiency    • Class 1 obesity    • Hypoglycemia    • Dyslipidemia    • Hyperuricemia    • Primary hypothyroidism    • Controlled type 2 diabetes mellitus with complication, with long-term current use of insulin (CMS/HCC) Yes     75-year-old female patient being evaluated today for the above diagnoses.  She had recent labs which were reviewed and she will be sent a copy.  She is hypothyroid according to her most recent labs.  She is complaining of boredom and fatigue during the pandemic.  She is taking her medications daily consistently as prescribed.  We discussed increasing her level thyroxine based on her TSH.  She is on simvastatin for LDL cholesterol.  Her LDL cholesterol is slightly above 100.  Her kidney function is stable.  She has no complaints.  She was recently fitted for inserts and shoes due to tendinitis in both feet.  She states this has improved her pain in her feet.  She still has some problems with walking.  Her medication list was reviewed and updated.    The following portions of the patient's history were reviewed and updated as appropriate: allergies, current medications, past family history, past medical history, past social history, past surgical history and problem list.    Review of Systems   Constitutional: Negative for appetite change and fatigue.   Eyes: Negative for visual disturbance.   Respiratory: Negative for cough.    Cardiovascular: Negative for leg swelling.   Gastrointestinal: Negative for constipation and diarrhea.   Endocrine: Negative for cold intolerance, heat intolerance, polydipsia, polyphagia and polyuria.   Genitourinary: Negative for frequency.   Musculoskeletal:        Tendonitis  in feet   Neurological: Negative for numbness.   Psychiatric/Behavioral: Negative for sleep disturbance.       Objective   Physical Exam   Constitutional: She is oriented to person, place, and time. She appears well-developed and well-nourished. No distress.   HENT:   Head: Normocephalic.   Neck: Normal range of motion.   Cardiovascular: Normal rate and regular rhythm.   Pulmonary/Chest: Effort normal. No respiratory distress.   Abdominal: Soft.   Musculoskeletal: Normal range of motion. She exhibits no edema.   Neurological: She is alert and oriented to person, place, and time.   Skin: Skin is warm and dry.   Psychiatric: She has a normal mood and affect. Her behavior is normal. Judgment and thought content normal.     Results for orders placed or performed in visit on 03/19/20   Comprehensive Metabolic Panel   Result Value Ref Range    Glucose 84 65 - 99 mg/dL    BUN 21 8 - 23 mg/dL    Creatinine 1.44 (H) 0.57 - 1.00 mg/dL    eGFR Non African Am 35 (L) >60 mL/min/1.73    eGFR African Am 43 (L) >60 mL/min/1.73    BUN/Creatinine Ratio 14.6 7.0 - 25.0    Sodium 143 136 - 145 mmol/L    Potassium 5.3 (H) 3.5 - 5.2 mmol/L    Chloride 105 98 - 107 mmol/L    Total CO2 28.0 22.0 - 29.0 mmol/L    Calcium 9.0 8.6 - 10.5 mg/dL    Total Protein 6.2 6.0 - 8.5 g/dL    Albumin 3.70 3.50 - 5.20 g/dL    Globulin 2.5 gm/dL    A/G Ratio 1.5 g/dL    Total Bilirubin 0.8 0.2 - 1.2 mg/dL    Alkaline Phosphatase 71 39 - 117 U/L    AST (SGOT) 19 1 - 32 U/L    ALT (SGPT) 11 1 - 33 U/L   Lipid Panel   Result Value Ref Range    Total Cholesterol 173 0 - 200 mg/dL    Triglycerides 85 0 - 150 mg/dL    HDL Cholesterol 53 40 - 60 mg/dL    VLDL Cholesterol 17 mg/dL    LDL Cholesterol  103 (H) 0 - 100 mg/dL   T4 & TSH ([facility])   Result Value Ref Range    TSH 5.040 (H) 0.270 - 4.200 uIU/mL    T4, Total 8.26 4.50 - 11.70 mcg/dL   Hemoglobin A1c   Result Value Ref Range    Hemoglobin A1C 6.70 (H) 4.80 - 5.60 %   T4, Free   Result Value Ref Range     Free T4 1.39 0.93 - 1.70 ng/dL   C-Peptide   Result Value Ref Range    C-Peptide 0.6 (L) 1.1 - 4.4 ng/mL   Vitamin D 25 Hydroxy   Result Value Ref Range    25 Hydroxy, Vitamin D 46.1 30.0 - 100.0 ng/ml   MicroAlbumin, Urine, Random -   Result Value Ref Range    Microalbumin, Urine 135.0 Not Estab. ug/mL   Uric Acid   Result Value Ref Range    Uric Acid 4.6 2.4 - 5.7 mg/dL   Thyroglobulin With Anti-TG   Result Value Ref Range    Thyroglobulin Ab <1.0 0.0 - 0.9 IU/mL   Thyroglobulin By CHARAN   Result Value Ref Range    THYROGLOBULIN BY CHARAN 34.8 1.5 - 38.5 ng/mL   T3, Free   Result Value Ref Range    T3, Free 2.1 2.0 - 4.4 pg/mL   Cardiovascular Risk Assessment   Result Value Ref Range    Interpretation Note    Diabetes Patient Education   Result Value Ref Range    PDF Image Not applicable          Assessment/Plan   Problems Addressed this Visit        Cardiovascular and Mediastinum    Hypertension       Digestive    Vitamin D deficiency       Endocrine    Hypoglycemia    Primary hypothyroidism    Controlled type 2 diabetes mellitus with complication, with long-term current use of insulin (CMS/[facility]) - Primary       Other    Dyslipidemia    Hyperuricemia    Class 1 obesity      Other Visit Diagnoses     Uncontrolled type 2 diabetes mellitus with complication, with long-term current use of insulin (CMS/[facility])            In summary patient was evaluated.  She denies any physical changes other than inserts for her shoes due to tendinitis in both feet.  She states this improved her foot pain.  She does see a podiatrist.  She denies any skin problems.  She has had no cold exposure.  She denies any respiratory symptoms.  She is taking her medications daily consistently.  Her LDL cholesterol slightly above goal at 103.  We will continue to monitor.  She is currently on simvastatin 20 mg daily.  She has renal insufficiency which is stable.  Her uric acid is in satisfactory range.  Her weight has remained the same.  She has no  complaints at today's visit and she will follow-up in 6 months with labs prior.  She will be sent a copy of her laboratory evaluation.

## 2020-04-17 RX ORDER — ALLOPURINOL 100 MG/1
TABLET ORAL
Qty: 90 TABLET | Refills: 3 | Status: SHIPPED | OUTPATIENT
Start: 2020-04-17 | End: 2021-05-08 | Stop reason: SDUPTHER

## 2020-04-24 DIAGNOSIS — E55.9 VITAMIN D DEFICIENCY: ICD-10-CM

## 2020-04-24 DIAGNOSIS — N28.9 RENAL INSUFFICIENCY: ICD-10-CM

## 2020-04-24 DIAGNOSIS — E78.5 DYSLIPIDEMIA: ICD-10-CM

## 2020-04-24 RX ORDER — SITAGLIPTIN 50 MG/1
TABLET, FILM COATED ORAL
Qty: 90 TABLET | Refills: 1 | Status: SHIPPED | OUTPATIENT
Start: 2020-04-24 | End: 2020-12-04

## 2020-05-21 DIAGNOSIS — E55.9 VITAMIN D DEFICIENCY: ICD-10-CM

## 2020-05-21 DIAGNOSIS — IMO0002 UNCONTROLLED TYPE 2 DIABETES MELLITUS: ICD-10-CM

## 2020-05-21 DIAGNOSIS — E78.5 DYSLIPIDEMIA: ICD-10-CM

## 2020-05-21 DIAGNOSIS — N28.9 RENAL INSUFFICIENCY: ICD-10-CM

## 2020-05-21 RX ORDER — PEN NEEDLE, DIABETIC 31 GX5/16"
NEEDLE, DISPOSABLE MISCELLANEOUS
Qty: 100 EACH | Refills: 0 | Status: SHIPPED | OUTPATIENT
Start: 2020-05-21 | End: 2020-08-24

## 2020-05-26 RX ORDER — SIMVASTATIN 20 MG
TABLET ORAL
Qty: 90 TABLET | Refills: 0 | Status: SHIPPED | OUTPATIENT
Start: 2020-05-26 | End: 2020-09-03

## 2020-07-21 DIAGNOSIS — N28.9 RENAL INSUFFICIENCY: ICD-10-CM

## 2020-07-21 DIAGNOSIS — E78.5 DYSLIPIDEMIA: ICD-10-CM

## 2020-07-21 DIAGNOSIS — E55.9 VITAMIN D DEFICIENCY: ICD-10-CM

## 2020-07-21 RX ORDER — METOPROLOL TARTRATE 50 MG/1
TABLET, FILM COATED ORAL
Qty: 90 TABLET | Refills: 0 | Status: SHIPPED | OUTPATIENT
Start: 2020-07-21 | End: 2020-10-26

## 2020-08-22 DIAGNOSIS — E78.5 DYSLIPIDEMIA: ICD-10-CM

## 2020-08-22 DIAGNOSIS — E55.9 VITAMIN D DEFICIENCY: ICD-10-CM

## 2020-08-22 DIAGNOSIS — IMO0002 UNCONTROLLED TYPE 2 DIABETES MELLITUS: ICD-10-CM

## 2020-08-22 DIAGNOSIS — N28.9 RENAL INSUFFICIENCY: ICD-10-CM

## 2020-08-24 RX ORDER — BLOOD SUGAR DIAGNOSTIC
STRIP MISCELLANEOUS
Qty: 100 EACH | Refills: 1 | Status: SHIPPED | OUTPATIENT
Start: 2020-08-24 | End: 2021-01-15

## 2020-08-24 RX ORDER — PEN NEEDLE, DIABETIC 31 GX5/16"
NEEDLE, DISPOSABLE MISCELLANEOUS
Qty: 100 EACH | Refills: 0 | Status: SHIPPED | OUTPATIENT
Start: 2020-08-24 | End: 2020-12-14

## 2020-09-03 RX ORDER — SIMVASTATIN 20 MG
TABLET ORAL
Qty: 90 TABLET | Refills: 3 | Status: SHIPPED | OUTPATIENT
Start: 2020-09-03 | End: 2021-09-15 | Stop reason: SDUPTHER

## 2020-10-06 ENCOUNTER — LAB (OUTPATIENT)
Dept: ENDOCRINOLOGY | Age: 75
End: 2020-10-06

## 2020-10-06 DIAGNOSIS — E55.9 VITAMIN D DEFICIENCY: ICD-10-CM

## 2020-10-06 DIAGNOSIS — I10 ESSENTIAL HYPERTENSION: Primary | ICD-10-CM

## 2020-10-06 DIAGNOSIS — N18.30 STAGE 3 CHRONIC KIDNEY DISEASE, UNSPECIFIED WHETHER STAGE 3A OR 3B CKD (HCC): ICD-10-CM

## 2020-10-06 DIAGNOSIS — E78.5 DYSLIPIDEMIA: ICD-10-CM

## 2020-10-06 DIAGNOSIS — N28.9 RENAL INSUFFICIENCY: ICD-10-CM

## 2020-10-06 DIAGNOSIS — E11.65 UNCONTROLLED TYPE 2 DIABETES MELLITUS WITH HYPERGLYCEMIA (HCC): ICD-10-CM

## 2020-10-06 DIAGNOSIS — E16.2 HYPOGLYCEMIA: ICD-10-CM

## 2020-10-12 LAB
25(OH)D3+25(OH)D2 SERPL-MCNC: 54.4 NG/ML (ref 30–100)
ALBUMIN SERPL-MCNC: 3.9 G/DL (ref 3.5–5.2)
ALBUMIN/GLOB SERPL: 2 G/DL
ALP SERPL-CCNC: 68 U/L (ref 39–117)
ALT SERPL-CCNC: 10 U/L (ref 1–33)
AST SERPL-CCNC: 19 U/L (ref 1–32)
BILIRUB SERPL-MCNC: 0.8 MG/DL (ref 0–1.2)
BUN SERPL-MCNC: 23 MG/DL (ref 8–23)
BUN/CREAT SERPL: 15.6 (ref 7–25)
C PEPTIDE SERPL-MCNC: 0.7 NG/ML (ref 1.1–4.4)
CALCIUM SERPL-MCNC: 8.9 MG/DL (ref 8.6–10.5)
CHLORIDE SERPL-SCNC: 105 MMOL/L (ref 98–107)
CHOLEST SERPL-MCNC: 188 MG/DL (ref 0–200)
CO2 SERPL-SCNC: 28.5 MMOL/L (ref 22–29)
CREAT SERPL-MCNC: 1.47 MG/DL (ref 0.57–1)
FT4I SERPL CALC-MCNC: 2.6 (ref 1.2–4.9)
GLOBULIN SER CALC-MCNC: 2 GM/DL
GLUCOSE SERPL-MCNC: 79 MG/DL (ref 65–99)
HBA1C MFR BLD: 6.5 % (ref 4.8–5.6)
HDLC SERPL-MCNC: 51 MG/DL (ref 40–60)
INTERPRETATION: NORMAL
LDLC SERPL CALC-MCNC: 114 MG/DL (ref 0–100)
Lab: NORMAL
MICROALBUMIN UR-MCNC: 8.4 UG/ML
POTASSIUM SERPL-SCNC: 5.3 MMOL/L (ref 3.5–5.2)
PROT SERPL-MCNC: 5.9 G/DL (ref 6–8.5)
SODIUM SERPL-SCNC: 141 MMOL/L (ref 136–145)
T3FREE SERPL-MCNC: 2.3 PG/ML (ref 2–4.4)
T3RU NFR SERPL: 30 % (ref 24–39)
T4 FREE SERPL-MCNC: 1.54 NG/DL (ref 0.93–1.7)
T4 SERPL-MCNC: 8.5 UG/DL (ref 4.5–12)
THYROGLOB SERPL-MCNC: 25 NG/ML
TRIGL SERPL-MCNC: 116 MG/DL (ref 0–150)
TSH SERPL DL<=0.005 MIU/L-ACNC: 2.72 UIU/ML (ref 0.45–4.5)
VLDLC SERPL CALC-MCNC: 23.2 MG/DL

## 2020-10-20 ENCOUNTER — OFFICE VISIT (OUTPATIENT)
Dept: ENDOCRINOLOGY | Age: 75
End: 2020-10-20

## 2020-10-20 VITALS
HEIGHT: 64 IN | DIASTOLIC BLOOD PRESSURE: 60 MMHG | BODY MASS INDEX: 37.56 KG/M2 | WEIGHT: 220 LBS | SYSTOLIC BLOOD PRESSURE: 118 MMHG

## 2020-10-20 DIAGNOSIS — Z79.4 CONTROLLED TYPE 2 DIABETES MELLITUS WITH COMPLICATION, WITH LONG-TERM CURRENT USE OF INSULIN (HCC): Primary | ICD-10-CM

## 2020-10-20 DIAGNOSIS — E03.9 PRIMARY HYPOTHYROIDISM: ICD-10-CM

## 2020-10-20 DIAGNOSIS — E55.9 VITAMIN D DEFICIENCY: ICD-10-CM

## 2020-10-20 DIAGNOSIS — I10 ESSENTIAL HYPERTENSION: ICD-10-CM

## 2020-10-20 DIAGNOSIS — E78.5 DYSLIPIDEMIA: ICD-10-CM

## 2020-10-20 DIAGNOSIS — K59.04 CHRONIC IDIOPATHIC CONSTIPATION: ICD-10-CM

## 2020-10-20 DIAGNOSIS — E11.8 CONTROLLED TYPE 2 DIABETES MELLITUS WITH COMPLICATION, WITH LONG-TERM CURRENT USE OF INSULIN (HCC): Primary | ICD-10-CM

## 2020-10-20 PROCEDURE — 99214 OFFICE O/P EST MOD 30 MIN: CPT | Performed by: NURSE PRACTITIONER

## 2020-10-20 RX ORDER — INSULIN GLARGINE 100 [IU]/ML
25 INJECTION, SOLUTION SUBCUTANEOUS DAILY
Qty: 45 ML | Refills: 2 | Status: SHIPPED | OUTPATIENT
Start: 2020-10-20 | End: 2020-12-24

## 2020-10-20 RX ORDER — LINACLOTIDE 290 UG/1
290 CAPSULE, GELATIN COATED ORAL
Qty: 30 CAPSULE | Refills: 5 | Status: SHIPPED | OUTPATIENT
Start: 2020-10-20 | End: 2021-11-17

## 2020-10-20 NOTE — PATIENT INSTRUCTIONS
linzess as directed for constipation   Increase water intake  Continue all current meds the same     Linaclotide oral capsules  What is this medicine?  LINACLOTIDE (will a KLOE tide) is used to treat irritable bowel syndrome (IBS) with constipation as the main problem. It may also be used for relief of chronic constipation.  This medicine may be used for other purposes; ask your health care provider or pharmacist if you have questions.  COMMON BRAND NAME(S): Linzess  What should I tell my health care provider before I take this medicine?  They need to know if you have any of these conditions:  · history of stool (fecal) impaction  · now have diarrhea or have diarrhea often  · other medical condition  · stomach or intestinal disease, including bowel obstruction or abdominal adhesions  · an unusual or allergic reaction to linaclotide, other medicines, foods, dyes, or preservatives  · pregnant or trying to get pregnant  · breast-feeding  How should I use this medicine?  Take this medicine by mouth with a glass of water. Follow the directions on the prescription label. Do not cut, crush or chew this medicine. Take on an empty stomach, at least 30 minutes before your first meal of the day. Take your medicine at regular intervals. Do not take your medicine more often than directed. Do not stop taking except on your doctor's advice.  A special MedGuide will be given to you by the pharmacist with each prescription and refill. Be sure to read this information carefully each time.  Talk to your pediatrician regarding the use of this medicine in children. This medicine is not approved for use in children.  Overdosage: If you think you have taken too much of this medicine contact a poison control center or emergency room at once.  NOTE: This medicine is only for you. Do not share this medicine with others.  What if I miss a dose?  If you miss a dose, just skip that dose. Wait until your next dose, and take only that dose. Do not  take double or extra doses.  What may interact with this medicine?  · certain medicines for bowel problems or bladder incontinence (these can cause constipation)  This list may not describe all possible interactions. Give your health care provider a list of all the medicines, herbs, non-prescription drugs, or dietary supplements you use. Also tell them if you smoke, drink alcohol, or use illegal drugs. Some items may interact with your medicine.  What should I watch for while using this medicine?  Visit your doctor for regular check ups. Tell your doctor if your symptoms do not get better or if they get worse.  Diarrhea is a common side effect of this medicine. It often begins within 2 weeks of starting this medicine. Stop taking this medicine and call your doctor if you get severe diarrhea.  Stop taking this medicine and call your doctor or go to the nearest hospital emergency room right away if you develop unusual or severe stomach-area (abdominal) pain, especially if you also have bright red, bloody stools or black stools that look like tar.  What side effects may I notice from receiving this medicine?  Side effects that you should report to your doctor or health care professional as soon as possible:  · allergic reactions like skin rash, itching or hives, swelling of the face, lips, or tongue  · black, tarry stools  · bloody or watery diarrhea  · new or worsening stomach pain  · severe or prolonged diarrhea  Side effects that usually do not require medical attention (report to your doctor or health care professional if they continue or are bothersome):  · bloating  · gas  · loose stools  This list may not describe all possible side effects. Call your doctor for medical advice about side effects. You may report side effects to FDA at 6-248-FDA-0798.  Where should I keep my medicine?  Keep out of the reach of children.  Store at room temperature between 20 and 25 degrees C (68 and 77 degrees F). Keep this medicine  in the original container. Keep tightly closed in a dry place. Do not remove the desiccant packet from the bottle, it helps to protect your medicine from moisture. Throw away any unused medicine after the expiration date.  NOTE: This sheet is a summary. It may not cover all possible information. If you have questions about this medicine, talk to your doctor, pharmacist, or health care provider.  © 2020 [employer] (2017-01-19 12:17:04)

## 2020-10-20 NOTE — PROGRESS NOTES
"Subjective   Rachel Albrecht is a 75 y.o. female is here today for follow-up.  Chief Complaint   Patient presents with   • Diabetes     F/U recent labs, controlled type 2 diabetes, checks BS once day, has readings, eye exam 2020   • Hypertension     /60   Ht 162.6 cm (64\")   Wt 99.8 kg (220 lb)   BMI 37.76 kg/m²   Current Outpatient Medications on File Prior to Visit   Medication Sig   • allopurinol (ZYLOPRIM) 100 MG tablet TAKE 1 TABLET DAILY   • B-D ULTRAFINE III SHORT PEN 31G X 8 MM misc USE TO INJECT INSULIN DAILY   • Blood Glucose Monitoring Suppl (ONE TOUCH ULTRA MINI) w/Device kit Use to test blood sugar twice a day   • Cholecalciferol (VITAMIN D3) 2000 UNITS tablet Take 2 tablets by mouth daily.   • JANUVIA 50 MG tablet TAKE 1 TABLET DAILY   • levothyroxine (Synthroid) 88 MCG tablet Take 1 tablet by mouth Daily.   • metoprolol tartrate (LOPRESSOR) 50 MG tablet TAKE 1 TABLET DAILY   • Multiple Vitamins-Minerals (PRESERVISION AREDS PO) Take 1 tablet by mouth daily.   • ONE TOUCH LANCETS misc Check blood glucose twice daily   • ONETOUCH ULTRA test strip USE TO TEST BLOOD SUGAR TWO TIMES A DAY AS DIRECTED   • pioglitazone (ACTOS) 45 MG tablet TAKE 1 TABLET DAILY   • simvastatin (ZOCOR) 20 MG tablet TAKE 1 TABLET EVERY NIGHT   • [DISCONTINUED] LANTUS SOLOSTAR 100 UNIT/ML injection pen INJECT 25 UNITS UNDER THE SKIN INTO THE APPROPRIATE AREA AS DIRECTED DAILY (Patient taking differently: 27 Units.)     No current facility-administered medications on file prior to visit.      Family History   Problem Relation Age of Onset   • Diabetes Father    • Kidney disease Father    • Heart attack Father    • Diabetes Brother    • Kidney disease Brother      Social History     Tobacco Use   • Smoking status: Never Smoker   • Smokeless tobacco: Never Used   Substance Use Topics   • Alcohol use: No   • Drug use: Not on file     No Known Allergies      History of Present Illness  Encounter Diagnoses   Name Primary?   • " Controlled type 2 diabetes mellitus with complication, with long-term current use of insulin (CMS/[facility]) Yes   • Primary hypothyroidism    • Dyslipidemia    • Essential hypertension    • Vitamin D deficiency    • Chronic idiopathic constipation      75-year-old female patient here today for follow-up visit.  She has been seen for the above-mentioned problems.  She is complaining of constipation.  We discussed trying Linzess and she was provided samples at today's visit.  She denies any symptoms associated with hyper or hypothyroidism.  Her medication list was reviewed and updated for accuracy.  She currently checks her blood sugars once daily.  She has had some low blood sugars in the 60 range.  We discussed decreasing her Lantus insulin by 2 units from 27 down to 25.  She is not symptomatic with low blood sugars.  She denies any complaints or concerns.  She does see a nephrologist but has not been to see one in a while.  Her kidney function is stable  The following portions of the patient's history were reviewed and updated as appropriate: allergies, current medications, past family history, past medical history, past social history, past surgical history and problem list.    Review of Systems   Constitutional: Negative.    Cardiovascular: Negative.    Gastrointestinal: Positive for constipation (possibly due to meds).   Endocrine: Negative.    Neurological: Negative.    Psychiatric/Behavioral: Negative for sleep disturbance.       Objective   Physical Exam  Vitals signs and nursing note reviewed.   Constitutional:       General: She is not in acute distress.     Appearance: She is well-developed. She is not diaphoretic.   HENT:      Head: Normocephalic and atraumatic.      Right Ear: External ear normal.      Left Ear: External ear normal.      Nose: Nose normal.      Mouth/Throat:      Pharynx: No oropharyngeal exudate.   Eyes:      General:         Right eye: No discharge.         Left eye: No discharge.       Pupils: Pupils are equal, round, and reactive to light.   Neck:      Musculoskeletal: Full passive range of motion without pain, normal range of motion and neck supple. No edema or erythema.      Thyroid: No thyroid mass or thyromegaly.      Vascular: No carotid bruit.      Trachea: Trachea normal. No tracheal tenderness or tracheal deviation.   Cardiovascular:      Rate and Rhythm: Normal rate and regular rhythm.      Heart sounds: Normal heart sounds. No murmur. No friction rub. No gallop.    Pulmonary:      Effort: Pulmonary effort is normal. No respiratory distress.      Breath sounds: Normal breath sounds. No stridor. No wheezing or rales.   Abdominal:      General: Bowel sounds are normal. There is no distension.      Palpations: Abdomen is soft.   Musculoskeletal: Normal range of motion.         General: Swelling present. No deformity.      Comments: Bilateral lower extremity swelling has compression socks on   Lymphadenopathy:      Cervical: No cervical adenopathy.   Skin:     General: Skin is warm and dry.      Coloration: Skin is not pale.      Findings: No erythema or rash.   Neurological:      Mental Status: She is alert and oriented to person, place, and time.   Psychiatric:         Behavior: Behavior normal.         Thought Content: Thought content normal.         Judgment: Judgment normal.       Results for orders placed or performed in visit on 10/06/20   T3, Free    Specimen: Blood   Result Value Ref Range    T3, Free 2.3 2.0 - 4.4 pg/mL   T4, Free    Specimen: Blood   Result Value Ref Range    Free T4 1.54 0.93 - 1.70 ng/dL   Thyroglobulin    Specimen: Blood   Result Value Ref Range    Thyroglobulin (TG-ELISABETH) 25 ng/mL   Thyroid Panel With TSH    Specimen: Blood   Result Value Ref Range    TSH 2.720 0.450 - 4.500 uIU/mL    T4, Total 8.5 4.5 - 12.0 ug/dL    T3 Uptake 30 24 - 39 %    Free Thyroxine Index 2.6 1.2 - 4.9   C-Peptide    Specimen: Blood   Result Value Ref Range    C-Peptide 0.7 (L) 1.1 -  4.4 ng/mL   Hemoglobin A1c    Specimen: Blood   Result Value Ref Range    Hemoglobin A1C 6.50 (H) 4.80 - 5.60 %   Vitamin D 25 Hydroxy    Specimen: Blood   Result Value Ref Range    25 Hydroxy, Vitamin D 54.4 30.0 - 100.0 ng/ml   MicroAlbumin, Urine, Random - Urine, Clean Catch    Specimen: Urine, Clean Catch   Result Value Ref Range    Microalbumin, Urine 8.4 Not Estab. ug/mL   Lipid Panel    Specimen: Blood   Result Value Ref Range    Total Cholesterol 188 0 - 200 mg/dL    Triglycerides 116 0 - 150 mg/dL    HDL Cholesterol 51 40 - 60 mg/dL    VLDL Cholesterol Max 23.2 mg/dL    LDL Chol Calc (NIH) 114 (H) 0 - 100 mg/dL   Comprehensive Metabolic Panel    Specimen: Blood   Result Value Ref Range    Glucose 79 65 - 99 mg/dL    BUN 23 8 - 23 mg/dL    Creatinine 1.47 (H) 0.57 - 1.00 mg/dL    eGFR Non African Am 34 (L) >60 mL/min/1.73    eGFR  Am 42 (L) >60 mL/min/1.73    BUN/Creatinine Ratio 15.6 7.0 - 25.0    Sodium 141 136 - 145 mmol/L    Potassium 5.3 (H) 3.5 - 5.2 mmol/L    Chloride 105 98 - 107 mmol/L    Total CO2 28.5 22.0 - 29.0 mmol/L    Calcium 8.9 8.6 - 10.5 mg/dL    Total Protein 5.9 (L) 6.0 - 8.5 g/dL    Albumin 3.90 3.50 - 5.20 g/dL    Globulin 2.0 gm/dL    A/G Ratio 2.0 g/dL    Total Bilirubin 0.8 0.0 - 1.2 mg/dL    Alkaline Phosphatase 68 39 - 117 U/L    AST (SGOT) 19 1 - 32 U/L    ALT (SGPT) 10 1 - 33 U/L   Cardiovascular Risk Assessment   Result Value Ref Range    Interpretation Note    Diabetes Patient Education   Result Value Ref Range    PDF Image Not applicable          Assessment/Plan   Problems Addressed this Visit        Cardiovascular and Mediastinum    Hypertension       Digestive    Vitamin D deficiency    Chronic idiopathic constipation       Endocrine    Controlled type 2 diabetes mellitus with complication, with long-term current use of insulin (CMS/[facility]) - Primary    Relevant Medications    Insulin Glargine (Lantus SoloStar) 100 UNIT/ML injection pen       Other    Dyslipidemia     Primary hypothyroidism      Diagnoses       Codes Comments    Controlled type 2 diabetes mellitus with complication, with long-term current use of insulin (CMS/[facility])    -  Primary ICD-10-CM: E11.8, Z79.4  ICD-9-CM: 250.90, V58.67     Primary hypothyroidism     ICD-10-CM: E03.9  ICD-9-CM: 244.9     Dyslipidemia     ICD-10-CM: E78.5  ICD-9-CM: 272.4     Essential hypertension     ICD-10-CM: I10  ICD-9-CM: 401.9     Vitamin D deficiency     ICD-10-CM: E55.9  ICD-9-CM: 268.9     Chronic idiopathic constipation     ICD-10-CM: K59.04  ICD-9-CM: 564.00         Patient was seen and examined.  Her insulin has been decreased to 25 units from 27.  Her morning blood sugars are sometimes in the 60 range.  We discussed that her blood sugars need to be greater than 70.  She has complained of constipation a prescription for Linzess 290 mcg has been sent to her pharmacy.  She was given samples at today's visit to try and instructed on how to take this medication as well as the mechanism of action.  Her hemoglobin A1c reflects good glycemic control.  Thyroid hormones are overall satisfactory.  She is on simvastatin for LDL cholesterol which is above goal.  Her thyroid hormones are in satisfactory range.  She will continue all her other current medications as prescribed.  Vitamin D is stable.  Her blood pressure is in satisfactory range.  She will follow-up with endocrinology as her next visit with labs prior.  She has been encouraged to reach out to the office should she have any questions or concerns prior to her next visit.

## 2020-10-26 DIAGNOSIS — E55.9 VITAMIN D DEFICIENCY: ICD-10-CM

## 2020-10-26 DIAGNOSIS — E78.5 DYSLIPIDEMIA: ICD-10-CM

## 2020-10-26 DIAGNOSIS — N28.9 RENAL INSUFFICIENCY: ICD-10-CM

## 2020-10-26 RX ORDER — METOPROLOL TARTRATE 50 MG/1
TABLET, FILM COATED ORAL
Qty: 90 TABLET | Refills: 0 | Status: SHIPPED | OUTPATIENT
Start: 2020-10-26 | End: 2021-01-20

## 2020-12-04 DIAGNOSIS — N28.9 RENAL INSUFFICIENCY: ICD-10-CM

## 2020-12-04 DIAGNOSIS — E78.5 DYSLIPIDEMIA: ICD-10-CM

## 2020-12-04 DIAGNOSIS — E55.9 VITAMIN D DEFICIENCY: ICD-10-CM

## 2020-12-04 RX ORDER — SITAGLIPTIN 50 MG/1
TABLET, FILM COATED ORAL
Qty: 90 TABLET | Refills: 1 | Status: SHIPPED | OUTPATIENT
Start: 2020-12-04 | End: 2021-06-21 | Stop reason: SDUPTHER

## 2020-12-11 DIAGNOSIS — N28.9 RENAL INSUFFICIENCY: ICD-10-CM

## 2020-12-11 DIAGNOSIS — E55.9 VITAMIN D DEFICIENCY: ICD-10-CM

## 2020-12-11 DIAGNOSIS — E78.5 DYSLIPIDEMIA: ICD-10-CM

## 2020-12-11 DIAGNOSIS — IMO0002 UNCONTROLLED TYPE 2 DIABETES MELLITUS: ICD-10-CM

## 2020-12-14 RX ORDER — PEN NEEDLE, DIABETIC 31 GX5/16"
NEEDLE, DISPOSABLE MISCELLANEOUS
Qty: 100 EACH | Refills: 3 | Status: SHIPPED | OUTPATIENT
Start: 2020-12-14 | End: 2022-03-16 | Stop reason: SDUPTHER

## 2020-12-24 RX ORDER — INSULIN GLARGINE 100 [IU]/ML
INJECTION, SOLUTION SUBCUTANEOUS
Qty: 45 ML | Refills: 4 | Status: SHIPPED | OUTPATIENT
Start: 2020-12-24 | End: 2021-04-20

## 2020-12-31 RX ORDER — PIOGLITAZONEHYDROCHLORIDE 45 MG/1
TABLET ORAL
Qty: 90 TABLET | Refills: 0 | Status: SHIPPED | OUTPATIENT
Start: 2020-12-31 | End: 2021-03-29

## 2021-01-15 RX ORDER — BLOOD SUGAR DIAGNOSTIC
STRIP MISCELLANEOUS
Qty: 100 EACH | Refills: 1 | Status: SHIPPED | OUTPATIENT
Start: 2021-01-15 | End: 2021-04-12

## 2021-01-20 DIAGNOSIS — E78.5 DYSLIPIDEMIA: ICD-10-CM

## 2021-01-20 DIAGNOSIS — E55.9 VITAMIN D DEFICIENCY: ICD-10-CM

## 2021-01-20 DIAGNOSIS — N28.9 RENAL INSUFFICIENCY: ICD-10-CM

## 2021-01-20 RX ORDER — METOPROLOL TARTRATE 50 MG/1
TABLET, FILM COATED ORAL
Qty: 90 TABLET | Refills: 1 | Status: SHIPPED | OUTPATIENT
Start: 2021-01-20 | End: 2021-07-16 | Stop reason: SDUPTHER

## 2021-02-15 RX ORDER — LEVOTHYROXINE SODIUM 88 UG/1
TABLET ORAL
Qty: 90 TABLET | Refills: 0 | Status: SHIPPED | OUTPATIENT
Start: 2021-02-15 | End: 2021-07-21 | Stop reason: SDUPTHER

## 2021-03-15 ENCOUNTER — BULK ORDERING (OUTPATIENT)
Dept: CASE MANAGEMENT | Facility: OTHER | Age: 76
End: 2021-03-15

## 2021-03-15 DIAGNOSIS — Z23 IMMUNIZATION DUE: ICD-10-CM

## 2021-03-29 RX ORDER — PIOGLITAZONEHYDROCHLORIDE 45 MG/1
TABLET ORAL
Qty: 90 TABLET | Refills: 0 | Status: SHIPPED | OUTPATIENT
Start: 2021-03-29 | End: 2021-06-25 | Stop reason: SDUPTHER

## 2021-04-01 DIAGNOSIS — E11.649 UNCONTROLLED TYPE 2 DIABETES MELLITUS WITH HYPOGLYCEMIA, UNSPECIFIED HYPOGLYCEMIA COMA STATUS (HCC): ICD-10-CM

## 2021-04-01 DIAGNOSIS — I10 ESSENTIAL HYPERTENSION: ICD-10-CM

## 2021-04-01 DIAGNOSIS — E03.9 PRIMARY HYPOTHYROIDISM: ICD-10-CM

## 2021-04-01 DIAGNOSIS — E55.9 VITAMIN D DEFICIENCY: Primary | ICD-10-CM

## 2021-04-01 DIAGNOSIS — E78.5 DYSLIPIDEMIA: ICD-10-CM

## 2021-04-07 LAB
ALBUMIN SERPL-MCNC: 3.9 G/DL (ref 3.5–5.2)
ALBUMIN/GLOB SERPL: 1.9 G/DL
ALP SERPL-CCNC: 66 U/L (ref 39–117)
ALT SERPL-CCNC: 9 U/L (ref 1–33)
AST SERPL-CCNC: 20 U/L (ref 1–32)
BILIRUB SERPL-MCNC: 0.8 MG/DL (ref 0–1.2)
BUN SERPL-MCNC: 20 MG/DL (ref 8–23)
BUN/CREAT SERPL: 13.2 (ref 7–25)
CALCIUM SERPL-MCNC: 9.3 MG/DL (ref 8.6–10.5)
CHLORIDE SERPL-SCNC: 107 MMOL/L (ref 98–107)
CHOLEST SERPL-MCNC: 161 MG/DL (ref 0–200)
CO2 SERPL-SCNC: 29.4 MMOL/L (ref 22–29)
CREAT SERPL-MCNC: 1.52 MG/DL (ref 0.57–1)
GLOBULIN SER CALC-MCNC: 2.1 GM/DL
GLUCOSE SERPL-MCNC: 124 MG/DL (ref 65–99)
HBA1C MFR BLD: 6.3 % (ref 4.8–5.6)
HDLC SERPL-MCNC: 53 MG/DL (ref 40–60)
IMP & REVIEW OF LAB RESULTS: NORMAL
LDLC SERPL CALC-MCNC: 90 MG/DL (ref 0–100)
Lab: NORMAL
POTASSIUM SERPL-SCNC: 5.4 MMOL/L (ref 3.5–5.2)
PROT SERPL-MCNC: 6 G/DL (ref 6–8.5)
SODIUM SERPL-SCNC: 143 MMOL/L (ref 136–145)
TRIGL SERPL-MCNC: 101 MG/DL (ref 0–150)
TSH SERPL DL<=0.005 MIU/L-ACNC: 1.74 UIU/ML (ref 0.27–4.2)
VLDLC SERPL CALC-MCNC: 18 MG/DL (ref 5–40)

## 2021-04-12 RX ORDER — BLOOD SUGAR DIAGNOSTIC
STRIP MISCELLANEOUS
Qty: 100 EACH | Refills: 6 | Status: SHIPPED | OUTPATIENT
Start: 2021-04-12 | End: 2022-03-11

## 2021-04-12 NOTE — PROGRESS NOTES
Subjective   Rachel Albrecht is a 76 y.o. female.     F/u from [facility] for dm 2, hypothyroidism, hyperlipidemia, hypertension, vitamin d def/ testing bs 2 x day  Last dm eye exam 6/2/20 with dr Dias / last dm foot exam today with dr Coreas        Patient is a 76-year-old female came in for follow-up she is new to me.    She is known diabetes mellitus since age 43 and started on insulin more than 10 years ago.  She is on Lantus 25 units every morning, Januvia 50 mg once a day, and pioglitazone 45 mg once a day.  She checks her blood sugar twice a day.  Fasting glucose .  Bedtime glucose 107-251.  She has hypoglycemia in the early morning about once a month.  Hemoglobin A1c done in April 2021 is 6.3%.    She has proliferative diabetic retinopathy with macular edema.  She is receiving intraocular injections and last saw Dr. Dias 6 weeks ago.  She denies numbness, tingling or burning in her hands or feet.  She has chronic kidney disease with an EGFR of 33.    She has hyperlipidemia and is on simvastatin 20 mg/day.  She denies myalgia.  Lipid panel done in April 2021 are as follows: Cholesterol 161.  HDL 53.  LDL 90.  Triglycerides 101.    She has hypothyroidism and is on levothyroxine 88 mcg/day.  She has no history of goiter, thyroid surgery or radiation therapy.  TSH done in April 2021 is normal at 1.74.    She has hypertension and is on metoprolol tartrate 50 mg once a day.  She has no history of heart attack or stroke.  She denies chest pain or shortness of breath.    She has hyperuricemia and is on allopurinol 100 mg once a day.  She denies history of gout.    She has sleep apnea and is sleeping well with the CPAP.  She wakes up rested.    The following portions of the patient's history were reviewed and updated as appropriate: allergies, current medications, past family history, past medical history, past social history, past surgical history and problem list.    Review of Systems   Eyes: Positive for visual  "disturbance.   Respiratory: Negative for shortness of breath.    Cardiovascular: Negative for chest pain and palpitations.   Gastrointestinal: Negative.    Endocrine: Negative for cold intolerance and heat intolerance.   Genitourinary: Negative.    Musculoskeletal: Negative for myalgias.   Neurological: Negative for numbness.     Objective      Vitals:    04/20/21 1015   BP: 138/60   BP Location: Right arm   Patient Position: Sitting   Cuff Size: Large Adult   Pulse: 56   SpO2: 94%   Weight: 96.6 kg (213 lb)   Height: 162.6 cm (64.02\")     Physical Exam  Constitutional:       General: She is not in acute distress.     Appearance: Normal appearance. She is not ill-appearing, toxic-appearing or diaphoretic.   HENT:      Nose: No rhinorrhea.      Mouth/Throat:      Mouth: Mucous membranes are dry.      Pharynx: Oropharynx is clear. No oropharyngeal exudate or posterior oropharyngeal erythema.   Eyes:      General:         Right eye: No discharge.         Left eye: No discharge.      Extraocular Movements: Extraocular movements intact.      Conjunctiva/sclera: Conjunctivae normal.   Neck:      Vascular: No carotid bruit.   Cardiovascular:      Rate and Rhythm: Normal rate and regular rhythm.      Pulses: Normal pulses.      Heart sounds: Normal heart sounds. No murmur heard.   No friction rub.   Pulmonary:      Effort: No respiratory distress.      Breath sounds: Normal breath sounds. No stridor. No wheezing, rhonchi or rales.   Chest:      Chest wall: No tenderness.   Abdominal:      General: Bowel sounds are normal. There is no distension.      Palpations: Abdomen is soft.      Tenderness: There is no abdominal tenderness.   Musculoskeletal:      Cervical back: No tenderness.      Right lower leg: Edema present.      Left lower leg: Edema present.      Comments: No plantar ulcers.  Few plantar calluses.  Chronic venous stasis changes on distal lower extremities.  No calf tenderness   Lymphadenopathy:      Cervical: No " cervical adenopathy.   Neurological:      General: No focal deficit present.      Mental Status: She is alert and oriented to person, place, and time.      Comments: Intact light touch in both lower extremities.       Orders Only on 04/06/2021   Component Date Value Ref Range Status   • Glucose 04/06/2021 124* 65 - 99 mg/dL Final   • BUN 04/06/2021 20  8 - 23 mg/dL Final   • Creatinine 04/06/2021 1.52* 0.57 - 1.00 mg/dL Final   • eGFR Non African Am 04/06/2021 33* >60 mL/min/1.73 Final    Comment: The MDRD GFR formula is only valid for adults with stable  renal function between ages 18 and 70.     • eGFR  Am 04/06/2021 40* >60 mL/min/1.73 Final   • BUN/Creatinine Ratio 04/06/2021 13.2  7.0 - 25.0 Final   • Sodium 04/06/2021 143  136 - 145 mmol/L Final   • Potassium 04/06/2021 5.4* 3.5 - 5.2 mmol/L Final   • Chloride 04/06/2021 107  98 - 107 mmol/L Final   • Total CO2 04/06/2021 29.4* 22.0 - 29.0 mmol/L Final   • Calcium 04/06/2021 9.3  8.6 - 10.5 mg/dL Final   • Total Protein 04/06/2021 6.0  6.0 - 8.5 g/dL Final   • Albumin 04/06/2021 3.90  3.50 - 5.20 g/dL Final   • Globulin 04/06/2021 2.1  gm/dL Final   • A/G Ratio 04/06/2021 1.9  g/dL Final   • Total Bilirubin 04/06/2021 0.8  0.0 - 1.2 mg/dL Final   • Alkaline Phosphatase 04/06/2021 66  39 - 117 U/L Final   • AST (SGOT) 04/06/2021 20  1 - 32 U/L Final   • ALT (SGPT) 04/06/2021 9  1 - 33 U/L Final   • Total Cholesterol 04/06/2021 161  0 - 200 mg/dL Final    Comment: Cholesterol Reference Ranges  (U.S. Department of Health and Human Services ATP III  Classifications)  Desirable          <200 mg/dL  Borderline High    200-239 mg/dL  High Risk          >240 mg/dL  Triglyceride Reference Ranges  (U.S. Department of Health and Human Services ATP III  Classifications)  Normal           <150 mg/dL  Borderline High  150-199 mg/dL  High             200-499 mg/dL  Very High        >500 mg/dL  HDL Reference Ranges  (U.S. Department of Health and Human Services ATP  III  Classifcations)  Low     <40 mg/dl (major risk factor for CHD)  High    >60 mg/dl ('negative' risk factor for CHD)  LDL Reference Ranges  (U.S. Department of Health and Human Services ATP III  Classifcations)  Optimal          <100 mg/dL  Near Optimal     100-129 mg/dL  Borderline High  130-159 mg/dL  High             160-189 mg/dL  Very High        >189 mg/dL     • Triglycerides 04/06/2021 101  0 - 150 mg/dL Final   • HDL Cholesterol 04/06/2021 53  40 - 60 mg/dL Final   • VLDL Cholesterol Max 04/06/2021 18  5 - 40 mg/dL Final   • LDL Chol Calc ([facility]) 04/06/2021 90  0 - 100 mg/dL Final   • Hemoglobin A1C 04/06/2021 6.30* 4.80 - 5.60 % Final    Comment: Hemoglobin A1C Ranges:  Increased Risk for Diabetes  5.7% to 6.4%  Diabetes                     >= 6.5%  Diabetic Goal                < 7.0%     • TSH 04/06/2021 1.740  0.270 - 4.200 uIU/mL Final   • Interpretation 04/06/2021 Note   Final    Supplemental report is available.   • PDF Image 04/06/2021 Not applicable   Final     Assessment/Plan   Diagnoses and all orders for this visit:    1. Controlled type 2 diabetes mellitus with complication, with long-term current use of insulin (CMS/[facility]) (Primary)  -     Microalbumin / Creatinine Urine Ratio - Urine, Clean Catch  -     Comprehensive Metabolic Panel; Future  -     Hemoglobin A1c; Future  -     TSH; Future  -     Ambulatory Referral to Diabetic Education    2. Dyslipidemia  -     Lipid Panel; Future    3. Essential hypertension  -     Comprehensive Metabolic Panel; Future    4. Stage 3b chronic kidney disease (CMS/[facility])  -     Comprehensive Metabolic Panel; Future  -     Vitamin D 25 Hydroxy; Future    5. Primary hypothyroidism  -     TSH; Future    6. Obstructive sleep apnea syndrome  -     TSH; Future    Other orders  -     Insulin Glargine (Lantus SoloStar) 100 UNIT/ML injection pen; Inject 23 Units under the skin into the appropriate area as directed Daily. into the appropriate area as directed  Dispense: 45  mL; Refill: 4      Decrease Lantus to 23 units every morning.  Continue Januvia 50 mg/day and pioglitazone 45 mg/day.  Appointment with diabetes educator  Continues simvastatin 20 mg every evening.  Continue metoprolol tartrate per his PCP.  Continue levothyroxine 88 mcg/day.  Continue CPAP.    Copy of my note sent to Dr. Rivas and Dr. Dias    RTC 4 mos

## 2021-04-20 ENCOUNTER — OFFICE VISIT (OUTPATIENT)
Dept: ENDOCRINOLOGY | Age: 76
End: 2021-04-20

## 2021-04-20 VITALS
DIASTOLIC BLOOD PRESSURE: 60 MMHG | OXYGEN SATURATION: 94 % | BODY MASS INDEX: 36.37 KG/M2 | SYSTOLIC BLOOD PRESSURE: 138 MMHG | HEIGHT: 64 IN | WEIGHT: 213 LBS | HEART RATE: 56 BPM

## 2021-04-20 DIAGNOSIS — E03.9 PRIMARY HYPOTHYROIDISM: ICD-10-CM

## 2021-04-20 DIAGNOSIS — N18.32 STAGE 3B CHRONIC KIDNEY DISEASE (HCC): ICD-10-CM

## 2021-04-20 DIAGNOSIS — G47.33 OBSTRUCTIVE SLEEP APNEA SYNDROME: ICD-10-CM

## 2021-04-20 DIAGNOSIS — E78.5 DYSLIPIDEMIA: ICD-10-CM

## 2021-04-20 DIAGNOSIS — E11.8 CONTROLLED TYPE 2 DIABETES MELLITUS WITH COMPLICATION, WITH LONG-TERM CURRENT USE OF INSULIN (HCC): Primary | ICD-10-CM

## 2021-04-20 DIAGNOSIS — I10 ESSENTIAL HYPERTENSION: ICD-10-CM

## 2021-04-20 DIAGNOSIS — Z79.4 CONTROLLED TYPE 2 DIABETES MELLITUS WITH COMPLICATION, WITH LONG-TERM CURRENT USE OF INSULIN (HCC): Primary | ICD-10-CM

## 2021-04-20 PROCEDURE — 99214 OFFICE O/P EST MOD 30 MIN: CPT | Performed by: INTERNAL MEDICINE

## 2021-04-20 RX ORDER — INSULIN GLARGINE 100 [IU]/ML
23 INJECTION, SOLUTION SUBCUTANEOUS DAILY
Qty: 45 ML | Refills: 4
Start: 2021-04-20 | End: 2022-03-14

## 2021-04-21 LAB
ALBUMIN/CREAT UR: 146 MG/G CREAT (ref 0–29)
CREAT UR-MCNC: 43.5 MG/DL
MICROALBUMIN UR-MCNC: 63.4 UG/ML

## 2021-04-24 NOTE — PROGRESS NOTES
Urine microalbumin elevated.Repeat urine microalbumin to creatinine ratio.Consider adding ACE inhibitor if it remains elevated.    Send copy of labs to ELIEZER Pelaez

## 2021-05-08 RX ORDER — ALLOPURINOL 100 MG/1
100 TABLET ORAL DAILY
Qty: 90 TABLET | Refills: 1 | Status: SHIPPED | OUTPATIENT
Start: 2021-05-08 | End: 2021-10-25

## 2021-05-30 PROBLEM — E11.319 TYPE 2 DIABETES MELLITUS WITH RETINOPATHY (HCC): Status: ACTIVE | Noted: 2020-04-09

## 2021-06-09 NOTE — PATIENT INSTRUCTIONS
Continue all current medications   Sign up to weight watchers  Contact office with any questions or concerns.    Recorded ECG: HR=96 Condition=Condition 1

## 2021-06-21 DIAGNOSIS — N28.9 RENAL INSUFFICIENCY: ICD-10-CM

## 2021-06-21 DIAGNOSIS — E78.5 DYSLIPIDEMIA: ICD-10-CM

## 2021-06-21 DIAGNOSIS — E55.9 VITAMIN D DEFICIENCY: ICD-10-CM

## 2021-06-24 DIAGNOSIS — E55.9 VITAMIN D DEFICIENCY: ICD-10-CM

## 2021-06-24 DIAGNOSIS — N28.9 RENAL INSUFFICIENCY: ICD-10-CM

## 2021-06-24 DIAGNOSIS — E78.5 DYSLIPIDEMIA: ICD-10-CM

## 2021-06-26 RX ORDER — PIOGLITAZONEHYDROCHLORIDE 45 MG/1
45 TABLET ORAL DAILY
Qty: 90 TABLET | Refills: 1 | Status: SHIPPED | OUTPATIENT
Start: 2021-06-26 | End: 2022-01-04

## 2021-07-16 DIAGNOSIS — E78.5 DYSLIPIDEMIA: ICD-10-CM

## 2021-07-16 DIAGNOSIS — E55.9 VITAMIN D DEFICIENCY: ICD-10-CM

## 2021-07-16 DIAGNOSIS — N28.9 RENAL INSUFFICIENCY: ICD-10-CM

## 2021-07-16 RX ORDER — METOPROLOL TARTRATE 50 MG/1
50 TABLET, FILM COATED ORAL DAILY
Qty: 90 TABLET | Refills: 1 | Status: SHIPPED | OUTPATIENT
Start: 2021-07-16 | End: 2022-01-25

## 2021-07-21 RX ORDER — LEVOTHYROXINE SODIUM 88 UG/1
88 TABLET ORAL DAILY
Qty: 90 TABLET | Refills: 1 | Status: SHIPPED | OUTPATIENT
Start: 2021-07-21 | End: 2022-05-17 | Stop reason: SDUPTHER

## 2021-09-15 RX ORDER — SIMVASTATIN 20 MG
20 TABLET ORAL NIGHTLY
Qty: 90 TABLET | Refills: 1 | Status: SHIPPED | OUTPATIENT
Start: 2021-09-15 | End: 2022-03-31

## 2021-10-25 RX ORDER — ALLOPURINOL 100 MG/1
TABLET ORAL
Qty: 90 TABLET | Refills: 1 | Status: SHIPPED | OUTPATIENT
Start: 2021-10-25 | End: 2022-05-17 | Stop reason: SDUPTHER

## 2021-10-26 ENCOUNTER — TELEPHONE (OUTPATIENT)
Dept: ENDOCRINOLOGY | Age: 76
End: 2021-10-26

## 2021-10-26 NOTE — TELEPHONE ENCOUNTER
Patient called and has a appt 11/17 no labs have been put in.      Do we need to schedule a lab appt before her office visit.    [phone]

## 2021-11-17 ENCOUNTER — OFFICE VISIT (OUTPATIENT)
Dept: ENDOCRINOLOGY | Age: 76
End: 2021-11-17

## 2021-11-17 VITALS
SYSTOLIC BLOOD PRESSURE: 134 MMHG | HEART RATE: 56 BPM | HEIGHT: 64 IN | DIASTOLIC BLOOD PRESSURE: 80 MMHG | BODY MASS INDEX: 35.37 KG/M2 | WEIGHT: 207.2 LBS | OXYGEN SATURATION: 95 %

## 2021-11-17 DIAGNOSIS — I12.9 BENIGN HYPERTENSION WITH CKD (CHRONIC KIDNEY DISEASE) STAGE III (HCC): ICD-10-CM

## 2021-11-17 DIAGNOSIS — E03.9 PRIMARY HYPOTHYROIDISM: ICD-10-CM

## 2021-11-17 DIAGNOSIS — Z79.4 TYPE 2 DIABETES MELLITUS WITH RETINOPATHY AND MACULAR EDEMA, WITH LONG-TERM CURRENT USE OF INSULIN, UNSPECIFIED LATERALITY, UNSPECIFIED RETINOPATHY SEVERITY (HCC): Primary | ICD-10-CM

## 2021-11-17 DIAGNOSIS — E55.9 VITAMIN D DEFICIENCY: ICD-10-CM

## 2021-11-17 DIAGNOSIS — E11.311 TYPE 2 DIABETES MELLITUS WITH RETINOPATHY AND MACULAR EDEMA, WITH LONG-TERM CURRENT USE OF INSULIN, UNSPECIFIED LATERALITY, UNSPECIFIED RETINOPATHY SEVERITY (HCC): Primary | ICD-10-CM

## 2021-11-17 DIAGNOSIS — N18.30 BENIGN HYPERTENSION WITH CKD (CHRONIC KIDNEY DISEASE) STAGE III (HCC): ICD-10-CM

## 2021-11-17 DIAGNOSIS — E78.5 DYSLIPIDEMIA: ICD-10-CM

## 2021-11-17 PROCEDURE — 99214 OFFICE O/P EST MOD 30 MIN: CPT | Performed by: NURSE PRACTITIONER

## 2021-11-17 NOTE — PROGRESS NOTES
Chief Complaint  Chief Complaint   Patient presents with   • Hypothyroidism   • Diabetes       Subjective          History of Present Illness    Rachel Albrecht 76 y.o. presents for a follow-up evaluation for type 2 DM    She has been diabetic since age 43 and started insulin more than 10 yrs ago.    Pt is on the following medications for their DM: Lantus 23 units every morning, Januvia 50 mg daily and Actos 45 mg daily.      Pt complains of shortness of breath with activity and vision changes.    Denies diarrhea or constipation, palpitations or numbness and tingling in feet/hands.    Weight 6 lbs since April visit with diet changes, but she would like to lose more.    Pt does have a history of DM retinopathy with macular edema. She is receiving intraocular injections from Dr. Dias   Last eye exam was 03/21    Pt does have a history of nephropathy.  Patient is not currently taking ACE/ARB  Saw nephrology in the past, but is not seeing one at this time.    Pt does not have neuropathy.    Pt does not have a history of CAD or CVA.  Since last visit she has gotten diagnosed with pulmonary hypertension and is going to get heart cath.  She is seeing cardiology and pulmonology.      Last A1C in 04/21 was 6.3    Last microalbumin in 04/21 was positive      Blood Sugars    Blood glucoses are checked 2/day.    Fasting blood glucoses:  averaging around 100  170 last night    Pt has couple episodes of hypoglycemia a month in the morning.    Patient tries to follow a DM diet for the most part.        Hypothyroid    PT complains of dry skin (no change)    Denies hair loss, diarrhea or constipation ,chest pain or palpitations.    Current treatment is levothyroxine 88 mcg daily    Last labs in 04/21 showed TSH 1.740          Hyperlipidemia     Pt denies any muscle/body aches    Pt is currently taking simvastatin 20 mg HS    Last lipid panel in 04/21 showed Total 161, Triglycerides 101, HDL 53 and LDL 90    Pt has breakfast  "this morning.        Hypertension with CKD Stage 3    Pt denies any chest pain, palpitations, or headache    Current regimen includes metoprolol tartrate 50 mg daily        Vitamin D Deficiency    Current treatment includes 2,000 units 2 tablets daily          I have reviewed the patient's allergies, medicines, past medical hx, family hx and social hx.    Objective   Vital Signs:   /80   Pulse 56   Ht 162.6 cm (64\")   Wt 94 kg (207 lb 3.2 oz)   SpO2 95%   BMI 35.57 kg/m²       Physical Exam   Physical Exam  Constitutional:       General: She is not in acute distress.     Appearance: Normal appearance. She is not diaphoretic.   HENT:      Head: Normocephalic and atraumatic.   Eyes:      General:         Right eye: No discharge.         Left eye: No discharge.   Cardiovascular:      Rate and Rhythm: Normal rate and regular rhythm.      Heart sounds: Normal heart sounds. No murmur heard.  No friction rub. No gallop.    Pulmonary:      Effort: Pulmonary effort is normal. No respiratory distress.      Breath sounds: Normal breath sounds. No wheezing.   Skin:     General: Skin is warm and dry.   Neurological:      Mental Status: She is alert.   Psychiatric:         Mood and Affect: Mood normal.         Behavior: Behavior normal.                    Results Review:   Hemoglobin A1C   Date Value Ref Range Status   04/06/2021 6.30 (H) 4.80 - 5.60 % Final     Comment:     Hemoglobin A1C Ranges:  Increased Risk for Diabetes  5.7% to 6.4%  Diabetes                     >= 6.5%  Diabetic Goal                < 7.0%       Triglycerides   Date Value Ref Range Status   04/06/2021 101 0 - 150 mg/dL Final     HDL Cholesterol   Date Value Ref Range Status   04/06/2021 53 40 - 60 mg/dL Final     LDL Chol Calc (NIH)   Date Value Ref Range Status   04/06/2021 90 0 - 100 mg/dL Final     VLDL Cholesterol Max   Date Value Ref Range Status   04/06/2021 18 5 - 40 mg/dL Final         Assessment and Plan {CC Problem List  Visit " Diagnosis  ROS  Review (Popup)  [facility] Maintenance  Quality  BestPractice  Medications  SmartSets  SnapShot Encounters  Media :23  Diagnoses and all orders for this visit:    1. Type 2 diabetes mellitus with retinopathy and macular edema, with long-term current use of insulin, unspecified laterality, unspecified retinopathy severity (HCC) (Primary)  -     Comprehensive Metabolic Panel  -     Hemoglobin A1c  -     Microalbumin / Creatinine Urine Ratio - Urine, Clean Catch    Continue with Januvia and Actos  Continue with Lantus 23 units in the morning.  Advised that if she sees an increase in morning hypoglycemia that she should decrease her lantus by 3-4 units, stay there for 3 days and then decrease again if needed.  Check labs and urine  Last urine showed protein if positive again them may consider referral to nephrology.  Pt would like to see dietitian - set up appointment with Gisel Bradshaw.    2. Primary hypothyroidism  -     TSH    Check labs today  Continue with levothyroxine 88 mcg daily      3. Dyslipidemia  -     Comprehensive Metabolic Panel  -     Lipid Panel    Check labs today  Continue with statin      4. Benign hypertension with CKD (chronic kidney disease) stage III (HCC)  -     Comprehensive Metabolic Panel    Stable  Continue with current medication regimen  Monitor      5. Vitamin D deficiency  -     Vitamin D 25 Hydroxy    Check labs today  Continue with current treatment         No refills needed at this time      RTC in 6 months with Dr. Coreas.      Follow Up     Patient was given instructions and counseling regarding her condition or for health maintenance advice. Please see specific information pulled into the AVS if appropriate.              Alana Brenner, ALECIA  11/17/21

## 2021-11-18 DIAGNOSIS — R80.1 PERSISTENT PROTEINURIA: ICD-10-CM

## 2021-11-18 DIAGNOSIS — N18.32 STAGE 3B CHRONIC KIDNEY DISEASE (HCC): Primary | ICD-10-CM

## 2021-11-18 LAB
25(OH)D3+25(OH)D2 SERPL-MCNC: 49.9 NG/ML (ref 30–100)
ALBUMIN SERPL-MCNC: 3.5 G/DL (ref 3.7–4.7)
ALBUMIN/CREAT UR: 192 MG/G CREAT (ref 0–29)
ALBUMIN/GLOB SERPL: 1.5 {RATIO} (ref 1.2–2.2)
ALP SERPL-CCNC: 88 IU/L (ref 44–121)
ALT SERPL-CCNC: 19 IU/L (ref 0–32)
AST SERPL-CCNC: 35 IU/L (ref 0–40)
BILIRUB SERPL-MCNC: 0.9 MG/DL (ref 0–1.2)
BUN SERPL-MCNC: 18 MG/DL (ref 8–27)
BUN/CREAT SERPL: 12 (ref 12–28)
CALCIUM SERPL-MCNC: 9 MG/DL (ref 8.7–10.3)
CHLORIDE SERPL-SCNC: 104 MMOL/L (ref 96–106)
CHOLEST SERPL-MCNC: 164 MG/DL (ref 100–199)
CO2 SERPL-SCNC: 25 MMOL/L (ref 20–29)
CREAT SERPL-MCNC: 1.49 MG/DL (ref 0.57–1)
CREAT UR-MCNC: 61.1 MG/DL
GLOBULIN SER CALC-MCNC: 2.4 G/DL (ref 1.5–4.5)
GLUCOSE SERPL-MCNC: 97 MG/DL (ref 65–99)
HBA1C MFR BLD: 6.2 % (ref 4.8–5.6)
HDLC SERPL-MCNC: 46 MG/DL
IMP & REVIEW OF LAB RESULTS: NORMAL
LDLC SERPL CALC-MCNC: 100 MG/DL (ref 0–99)
MICROALBUMIN UR-MCNC: 117.5 UG/ML
POTASSIUM SERPL-SCNC: 5.2 MMOL/L (ref 3.5–5.2)
PROT SERPL-MCNC: 5.9 G/DL (ref 6–8.5)
REPORT: NORMAL
SODIUM SERPL-SCNC: 141 MMOL/L (ref 134–144)
TRIGL SERPL-MCNC: 96 MG/DL (ref 0–149)
TSH SERPL DL<=0.005 MIU/L-ACNC: 4.27 UIU/ML (ref 0.45–4.5)
VLDLC SERPL CALC-MCNC: 18 MG/DL (ref 5–40)

## 2021-11-18 NOTE — PROGRESS NOTES
A1C is at goal.    Kidney function is down, but stable.  Still spilling protein in your urine, therefore I would like to send to nephrology.    Otherwise labs look good.

## 2021-12-04 DIAGNOSIS — E55.9 VITAMIN D DEFICIENCY: ICD-10-CM

## 2021-12-04 DIAGNOSIS — E78.5 DYSLIPIDEMIA: ICD-10-CM

## 2021-12-04 DIAGNOSIS — N28.9 RENAL INSUFFICIENCY: ICD-10-CM

## 2021-12-06 RX ORDER — SITAGLIPTIN 50 MG/1
TABLET, FILM COATED ORAL
Qty: 90 TABLET | Refills: 1 | Status: SHIPPED | OUTPATIENT
Start: 2021-12-06 | End: 2022-05-17 | Stop reason: SDUPTHER

## 2022-01-04 RX ORDER — PIOGLITAZONEHYDROCHLORIDE 45 MG/1
TABLET ORAL
Qty: 90 TABLET | Refills: 1 | Status: SHIPPED | OUTPATIENT
Start: 2022-01-04 | End: 2022-01-06 | Stop reason: SDUPTHER

## 2022-01-06 RX ORDER — PIOGLITAZONEHYDROCHLORIDE 45 MG/1
45 TABLET ORAL DAILY
Qty: 90 TABLET | Refills: 1 | Status: SHIPPED | OUTPATIENT
Start: 2022-01-06 | End: 2022-03-14 | Stop reason: ALTCHOICE

## 2022-01-24 DIAGNOSIS — E78.5 DYSLIPIDEMIA: ICD-10-CM

## 2022-01-24 DIAGNOSIS — N28.9 RENAL INSUFFICIENCY: ICD-10-CM

## 2022-01-24 DIAGNOSIS — E55.9 VITAMIN D DEFICIENCY: ICD-10-CM

## 2022-01-25 RX ORDER — METOPROLOL TARTRATE 50 MG/1
TABLET, FILM COATED ORAL
Qty: 90 TABLET | Refills: 1 | Status: SHIPPED | OUTPATIENT
Start: 2022-01-25 | End: 2022-05-04 | Stop reason: SDUPTHER

## 2022-03-08 ENCOUNTER — TELEPHONE (OUTPATIENT)
Dept: ENDOCRINOLOGY | Age: 77
End: 2022-03-08

## 2022-03-11 RX ORDER — BLOOD SUGAR DIAGNOSTIC
STRIP MISCELLANEOUS
Qty: 200 EACH | Refills: 3 | Status: SHIPPED | OUTPATIENT
Start: 2022-03-11

## 2022-03-11 NOTE — PROGRESS NOTES
Subjective   Rachel Albrecht is a 77 y.o. female.     2 week hospital f/u    F/u for dm 2, hypothyroidism, hyperlipidemia, hypertension, vitamin d def/ testing bs 2 x day  Last dm eye exam 5/6/21with dr Dias / last dm foot exam today with dr Coreas         She has known diabetes mellitus since age 43 and started on insulin more than 10 years ago.  She was on Lantus 23 units every morning, Januvia 50 mg/day, and pioglitazone 45 mg once a day until February 2022 when she was seen in emergency room for hypoglycemia in the morning.  She denies missing a meal that morning.  Her Lantus dose was decreased to 16 units every morning and she was kept on Januvia 50 mg/day.  She was taken off Actos in February 2022.  She checks her blood sugar twice a day.  Fasting glucose 119-152.  Bedtime glucose 170-360.  She has intentionally lost 23 pounds since November 2021 with reduction in food intake and exercising at least twice a week.     She has proliferative diabetic retinopathy with macular edema.  She is receiving intraocular injections and last saw Dr. Lazarus today.  She denies numbness, tingling or burning in her hands or feet.    Urine microalbumin was elevated in November 2021.  She has chronic kidney disease with an EGFR of 33.     She has hyperlipidemia and is on simvastatin 20 mg/day.  She denies myalgia.      She has hypothyroidism and is on levothyroxine 88 mcg/day.  She has no history of goiter, thyroid surgery or radiation therapy.      She has hypertension and is on metoprolol tartrate 50 mg once a day and an unknown water pill.    She has pulmonary hypertension and was evaluated by Dr. Rosario and Dr. Jj.   Echocardiogram done in December 2021 showed ejection fraction of 60 to 65%.  Mildly enlarged right ventricular cavity.  Mild mitral regurgitation.  Moderate tricuspid regurgitation.  Severe pulmonary hypertension.  She had cardiac catheterization and pulmonary function tests in December 2021.  She has no  "history of heart attack or stroke.  She denies chest pain or shortness of breath.     She has sleep apnea and is sleeping well with the CPAP.  She wakes up rested.    She has hyperuricemia and is on allopurinol 100 mg once a day.  She denies history of gout or kidney stones.     The following portions of the patient's history were reviewed and updated as appropriate: allergies, current medications, past family history, past medical history, past social history, past surgical history and problem list.    Review of Systems   Constitutional: Negative.    HENT: Negative.    Respiratory: Negative for shortness of breath.    Cardiovascular: Negative for chest pain and palpitations.   Gastrointestinal: Negative.    Endocrine: Negative for cold intolerance and heat intolerance.   Genitourinary: Negative.    Musculoskeletal: Negative for myalgias.   Neurological: Negative for numbness.     Objective      Vitals:    03/14/22 1552   BP: 132/61   Pulse: 62   SpO2: 96%   Weight: 83.5 kg (184 lb)   Height: 162.6 cm (64.02\")     Physical Exam  Constitutional:       General: She is not in acute distress.     Appearance: She is obese. She is not ill-appearing, toxic-appearing or diaphoretic.   Eyes:      General: No scleral icterus.        Right eye: No discharge.         Left eye: No discharge.      Extraocular Movements: Extraocular movements intact.      Conjunctiva/sclera: Conjunctivae normal.   Neck:      Vascular: No carotid bruit.   Cardiovascular:      Rate and Rhythm: Normal rate and regular rhythm.      Pulses: Normal pulses.      Heart sounds: Normal heart sounds.   Pulmonary:      Effort: Pulmonary effort is normal.      Breath sounds: Normal breath sounds. No rales.   Chest:      Chest wall: No tenderness.   Abdominal:      General: Bowel sounds are normal. There is no distension.      Palpations: Abdomen is soft. There is no mass.      Tenderness: There is no right CVA tenderness or left CVA tenderness. "   Musculoskeletal:         General: No swelling or tenderness. Normal range of motion.      Cervical back: Normal range of motion and neck supple. No rigidity or tenderness.      Right lower leg: No edema.      Left lower leg: No edema.   Lymphadenopathy:      Cervical: No cervical adenopathy.   Skin:     General: Skin is warm.   Neurological:      General: No focal deficit present.      Mental Status: She is alert and oriented to person, place, and time.   Psychiatric:         Mood and Affect: Mood normal.         Behavior: Behavior normal.       Office Visit on 11/17/2021   Component Date Value Ref Range Status   • Glucose 11/17/2021 97  65 - 99 mg/dL Final   • BUN 11/17/2021 18  8 - 27 mg/dL Final   • Creatinine 11/17/2021 1.49 (A) 0.57 - 1.00 mg/dL Final   • eGFR Non  Am 11/17/2021 33 (A) >59 mL/min/1.73 Final   • eGFR African Am 11/17/2021 39 (A) >59 mL/min/1.73 Final    Comment: **In accordance with recommendations from the NKF-ASN Task force,**    [facility] is in the process of updating its eGFR calculation to the    2021 CKD-EPI creatinine equation that estimates kidney function    without a race variable.     • BUN/Creatinine Ratio 11/17/2021 12  12 - 28 Final   • Sodium 11/17/2021 141  134 - 144 mmol/L Final   • Potassium 11/17/2021 5.2  3.5 - 5.2 mmol/L Final   • Chloride 11/17/2021 104  96 - 106 mmol/L Final   • Total CO2 11/17/2021 25  20 - 29 mmol/L Final   • Calcium 11/17/2021 9.0  8.7 - 10.3 mg/dL Final   • Total Protein 11/17/2021 5.9 (A) 6.0 - 8.5 g/dL Final   • Albumin 11/17/2021 3.5 (A) 3.7 - 4.7 g/dL Final   • Globulin 11/17/2021 2.4  1.5 - 4.5 g/dL Final   • A/G Ratio 11/17/2021 1.5  1.2 - 2.2 Final   • Total Bilirubin 11/17/2021 0.9  0.0 - 1.2 mg/dL Final   • Alkaline Phosphatase 11/17/2021 88  44 - 121 IU/L Final                  **Please note reference interval change**   • AST (SGOT) 11/17/2021 35  0 - 40 IU/L Final   • ALT (SGPT) 11/17/2021 19  0 - 32 IU/L Final   • Total Cholesterol  11/17/2021 164  100 - 199 mg/dL Final   • Triglycerides 11/17/2021 96  0 - 149 mg/dL Final   • HDL Cholesterol 11/17/2021 46  >39 mg/dL Final   • VLDL Cholesterol Max 11/17/2021 18  5 - 40 mg/dL Final   • LDL Chol Calc ([facility]) 11/17/2021 100 (A) 0 - 99 mg/dL Final   • Hemoglobin A1C 11/17/2021 6.2 (A) 4.8 - 5.6 % Final    Comment:          Prediabetes: 5.7 - 6.4           Diabetes: >6.4           Glycemic control for adults with diabetes: <7.0     • TSH 11/17/2021 4.270  0.450 - 4.500 uIU/mL Final   • Creatinine, Urine 11/17/2021 61.1  Not Estab. mg/dL Final   • Microalbumin, Urine 11/17/2021 117.5  Not Estab. ug/mL Final   • Microalbumin/Creatinine Ratio 11/17/2021 192 (A) 0 - 29 mg/g creat Final    Comment:                        Normal:                0 -  29                         Moderately increased: 30 - 300                         Severely increased:       >300     • 25 Hydroxy, Vitamin D 11/17/2021 49.9  30.0 - 100.0 ng/mL Final    Comment: Vitamin D deficiency has been defined by the [place] [employer]  [employer] and an Endocrine Society practice guideline as a  level of serum 25-OH vitamin D less than 20 ng/mL (1,2).  The Endocrine Society went on to further define vitamin D  insufficiency as a level between 21 and 29 ng/mL (2).  1. IOM ([employer]). 2010. Dietary reference     intakes for calcium and D. [place]: The     National Academies Press.  2. Homero MF, Maura FORTUNE, Jacki ANGELES, et al.     Evaluation, treatment, and prevention of vitamin D     deficiency: an Endocrine Society clinical practice     guideline. JCEM. 2011 Jul; 96(7):1911-30.     • Interpretation 11/17/2021 Note   Final    Comment: -------------------------------  CHRONIC KIDNEY DISEASE:  EGFR, BLOOD PRESSURE, AND PROTEINURIA ASSESSMENT  Estimated GFR is falling significantly with time; annualized  rate of decline (all values) is -1.1 mL/min/year, p = 0.01.  Current eGFR is 33 mL/min/1.73mE2 corresponding to CKD  stage  3b. Multiply eGFR by 1.159 if patient is .  Estimated interval to CKD 5 (using most recent eGFR values)  is 204.2 months (67% CI = 164.5 - 262.7 months). Potassium  is within goal and has decreased, was 5.4 and now is 5.2  mmol/L. Albuminuria is present and has risen, was 145.7 and  now is 192.3 mg/g creat. Glycemic control (HB A1c: 6.2 %) is  within goal.  EGFR, BLOOD PRESSURE, AND PROTEINURIA TREATMENT SUGGESTIONS  -  Based upon current eGFR and presence of moderate  proteinuria, patient is at very high risk for adverse  outcomes such as CKD progression, CVD, and mortality.  Guidelines recommend a target blood pressure of 120/80 mmHg  or less in CKD patients to reduce cardiovascul                           ar risk and  CKD progression. Weight loss and optimal glycemic control  (in diabetes) are helpful in reducing proteinuria.  EGFR, BLOOD PRESSURE, AND PROTEINURIA FOLLOW-UP  -  Spot Urine Panel (Albumin preferred), fasting Renal Panel,  and Hemoglobin A1C within 6 months;  BONE and MINERAL ASSESSMENT  Corrected calcium is within goal and has not changed  significantly, was 9.3 and now is 9.4 mg/dL. Serum calcium  was corrected for low serum albumin. Carbon Dioxide is  within goal and has decreased, was 29 and now is 25 mmol/L.  Vitamin D is adequate (was 54.4 and now is 49.9 ng/mL).  BONE and MINERAL TREATMENT SUGGESTIONS  -  Interpretations require simultaneous measurements of serum  calcium and phosphorus.  BONE and MINERAL FOLLOW-UP  -  25-Hydroxy Vitamin D within 12 months; fasting PTH with  Renal Panel is recommended by KDOQI guidelines, at least  yearly;  LIPIDS ASSESSMENT  LDL-C is borderline high and has risen, was 90 and now is  100 mg/dL. Triglyceride is normal and has not janie                           nged  significantly, was 101 and now is 96 mg/dL. Non-HDL  Cholesterol is normal and has risen, was 108 and now is 118  mg/dL. HDL-C is normal and has decreased, was 53 and now  is  46 mg/dL.  LIPIDS TREATMENT SUGGESTIONS  -  Therapeutic lifestyle changes are always valuable to  maintain optimal blood lipid status (diet, exercise, weight  management). Begin statin. If statin already in use,  consider increasing dose to achieve at least a 50% LDL  reduction from baseline. Moderate or high intensity statin  is preferred. If statin cannot be tolerated or increased,  alternatives include use of an intestinal agent (ezetimibe  or bile acid sequestrant) or niacin.  LIPIDS FOLLOW-UP  -  fasting Lipid Panel within 3 months;  ANEMIA ASSESSMENT  Most recent order does not include a CBC Panel or iron  studies.  ANEMIA FOLLOW-UP  -  CBC is recommended by KDOQI guidelines, at least yearly;  -------------------------------  DISCLAIMER  These assessments and treatment suggestions are provided as  a co                           nvenience in support of the physician-patient  relationship and are not intended to replace the physician's  clinical judgment. They are derived from national guidelines  in addition to other evidence and expert opinion. The  clinician should consider this information within the  context of clinical opinion and the individual patient.  SEE GUIDANCE FOR CHRONIC KIDNEY DISEASE PROGRAM: Kidney  Disease Improving Global Outcomes (KDIGO) clinical practice  guidelines are at http://kdigo.org/home/guidelines/.  National Kidney Foundation Kidney Disease Outcomes Quality  Initiative (KDOQI (TM)), with its limitations and  disclaimers, are at www.kidney.org/professionals/KDOQI. This  program is intended for patients who have been diagnosed  with stages 3, 4, or pre-dialysis 5 CKD. It is not intended  for children, pregnant patients, or transplant patients.     • Interpretation 11/17/2021 Note   Final    Supplemental report is available.     Assessment/Plan   Diagnoses and all orders for this visit:    1. Type 2 diabetes mellitus with nephropathy (HCC) (Primary)    2. Dyslipidemia    3.  Vitamin D deficiency    4. Hyperuricemia    5. Stage 3b chronic kidney disease (HCC)    6. Type 2 diabetes mellitus with complications (HCC)    7. Primary hypothyroidism    Other orders  -     Insulin Glargine (Lantus SoloStar) 100 UNIT/ML injection pen; Inject 18 Units under the skin into the appropriate area as directed Daily. into the appropriate area as directed  Dispense: 45 mL; Refill: 4  -     nateglinide (Starlix) 60 MG tablet; 1/2 tablet at supper  Dispense: 45 tablet; Refill: 1      Increase Lantus to 18 units every morning.  Continue Januvia 50 mg once a day.  Start Starlix 60 mg 1/2 tablet at supper.  Continue Zocor 20 mg/day.  Continue levothyroxine 88 mcg/day.  Continue CPAP.    Copy of my note sent to Dr. Rivas , Dr. Rosario and Dr. Jj.    RTC 4 mos.

## 2022-03-14 ENCOUNTER — OFFICE VISIT (OUTPATIENT)
Dept: ENDOCRINOLOGY | Age: 77
End: 2022-03-14

## 2022-03-14 VITALS
HEART RATE: 62 BPM | OXYGEN SATURATION: 96 % | HEIGHT: 64 IN | DIASTOLIC BLOOD PRESSURE: 61 MMHG | WEIGHT: 184 LBS | SYSTOLIC BLOOD PRESSURE: 132 MMHG | BODY MASS INDEX: 31.41 KG/M2

## 2022-03-14 DIAGNOSIS — E55.9 VITAMIN D DEFICIENCY: ICD-10-CM

## 2022-03-14 DIAGNOSIS — E11.8 TYPE 2 DIABETES MELLITUS WITH COMPLICATIONS: ICD-10-CM

## 2022-03-14 DIAGNOSIS — E11.21 TYPE 2 DIABETES MELLITUS WITH NEPHROPATHY: Primary | ICD-10-CM

## 2022-03-14 DIAGNOSIS — N18.32 STAGE 3B CHRONIC KIDNEY DISEASE: ICD-10-CM

## 2022-03-14 DIAGNOSIS — E78.5 DYSLIPIDEMIA: ICD-10-CM

## 2022-03-14 DIAGNOSIS — E03.9 PRIMARY HYPOTHYROIDISM: ICD-10-CM

## 2022-03-14 DIAGNOSIS — E79.0 HYPERURICEMIA: ICD-10-CM

## 2022-03-14 PROBLEM — E11.9 TYPE 2 DIABETES MELLITUS: Status: RESOLVED | Noted: 2017-01-30 | Resolved: 2022-03-14

## 2022-03-14 PROCEDURE — 99214 OFFICE O/P EST MOD 30 MIN: CPT | Performed by: INTERNAL MEDICINE

## 2022-03-14 RX ORDER — INSULIN GLARGINE 100 [IU]/ML
18 INJECTION, SOLUTION SUBCUTANEOUS DAILY
Qty: 45 ML | Refills: 4 | Status: SHIPPED
Start: 2022-03-14 | End: 2022-05-17 | Stop reason: SDUPTHER

## 2022-03-14 RX ORDER — NATEGLINIDE 60 MG/1
TABLET ORAL
Qty: 45 TABLET | Refills: 1 | Status: SHIPPED | OUTPATIENT
Start: 2022-03-14 | End: 2022-05-17

## 2022-03-15 ENCOUNTER — TELEPHONE (OUTPATIENT)
Dept: ENDOCRINOLOGY | Age: 77
End: 2022-03-15

## 2022-03-15 NOTE — TELEPHONE ENCOUNTER
Pharmacy called asking to speak to someone regarding the nateglinide. They are wanting to clarify the current dose    Phone: [phone]   Reference: [id number]

## 2022-03-16 ENCOUNTER — TELEPHONE (OUTPATIENT)
Dept: ENDOCRINOLOGY | Age: 77
End: 2022-03-16

## 2022-03-16 DIAGNOSIS — E55.9 VITAMIN D DEFICIENCY: ICD-10-CM

## 2022-03-16 DIAGNOSIS — E78.5 DYSLIPIDEMIA: ICD-10-CM

## 2022-03-16 DIAGNOSIS — IMO0002 UNCONTROLLED TYPE 2 DIABETES MELLITUS: ICD-10-CM

## 2022-03-16 DIAGNOSIS — N28.9 RENAL INSUFFICIENCY: ICD-10-CM

## 2022-03-16 RX ORDER — PEN NEEDLE, DIABETIC 31 GX5/16"
NEEDLE, DISPOSABLE MISCELLANEOUS
Qty: 100 EACH | Refills: 3 | Status: SHIPPED | OUTPATIENT
Start: 2022-03-16 | End: 2022-05-17 | Stop reason: SDUPTHER

## 2022-03-28 ENCOUNTER — TELEPHONE (OUTPATIENT)
Dept: ENDOCRINOLOGY | Age: 77
End: 2022-03-28

## 2022-03-31 RX ORDER — GLUCAGON HYDROCHLORIDE 1 MG
1 KIT INJECTION ONCE
Qty: 1 EACH | Refills: 3 | Status: SHIPPED | OUTPATIENT
Start: 2022-03-31 | End: 2022-03-31

## 2022-03-31 RX ORDER — SIMVASTATIN 20 MG
TABLET ORAL
Qty: 90 TABLET | Refills: 1 | Status: SHIPPED | OUTPATIENT
Start: 2022-03-31 | End: 2022-05-17 | Stop reason: SDUPTHER

## 2022-04-12 ENCOUNTER — TELEPHONE (OUTPATIENT)
Dept: ENDOCRINOLOGY | Age: 77
End: 2022-04-12

## 2022-04-14 ENCOUNTER — TELEPHONE (OUTPATIENT)
Dept: ENDOCRINOLOGY | Age: 77
End: 2022-04-14

## 2022-04-14 RX ORDER — IBUPROFEN 600 MG/1
TABLET ORAL
Qty: 3 EACH | Refills: 3 | Status: SHIPPED | OUTPATIENT
Start: 2022-04-14

## 2022-04-15 ENCOUNTER — TELEPHONE (OUTPATIENT)
Dept: ENDOCRINOLOGY | Age: 77
End: 2022-04-15

## 2022-04-18 ENCOUNTER — TELEPHONE (OUTPATIENT)
Dept: ENDOCRINOLOGY | Age: 77
End: 2022-04-18

## 2022-04-22 DIAGNOSIS — E03.9 PRIMARY HYPOTHYROIDISM: ICD-10-CM

## 2022-04-22 DIAGNOSIS — E11.21 TYPE 2 DIABETES MELLITUS WITH NEPHROPATHY: ICD-10-CM

## 2022-04-22 DIAGNOSIS — E78.5 DYSLIPIDEMIA: ICD-10-CM

## 2022-04-22 DIAGNOSIS — I10 ESSENTIAL HYPERTENSION: ICD-10-CM

## 2022-04-22 DIAGNOSIS — E55.9 VITAMIN D DEFICIENCY: Primary | ICD-10-CM

## 2022-04-26 ENCOUNTER — TELEPHONE (OUTPATIENT)
Dept: ENDOCRINOLOGY | Age: 77
End: 2022-04-26

## 2022-04-26 NOTE — TELEPHONE ENCOUNTER
Patient called to request external labs be faxed to [facility] in [facility]. Lab orders have been faxed by me 4/26/22 @ 3:49pm

## 2022-05-04 DIAGNOSIS — E78.5 DYSLIPIDEMIA: ICD-10-CM

## 2022-05-04 DIAGNOSIS — N28.9 RENAL INSUFFICIENCY: ICD-10-CM

## 2022-05-04 DIAGNOSIS — E55.9 VITAMIN D DEFICIENCY: ICD-10-CM

## 2022-05-04 LAB
25(OH)D3+25(OH)D2 SERPL-MCNC: 54.8 NG/ML (ref 30–100)
ALBUMIN SERPL-MCNC: 3.9 G/DL (ref 3.7–4.7)
ALBUMIN/GLOB SERPL: 1.6 {RATIO} (ref 1.2–2.2)
ALP SERPL-CCNC: 109 IU/L (ref 44–121)
ALT SERPL-CCNC: 11 IU/L (ref 0–32)
AMBIG ABBREV CMP14 DEFAULT: NORMAL
AMBIG ABBREV LP DEFAULT: NORMAL
AST SERPL-CCNC: 24 IU/L (ref 0–40)
BILIRUB SERPL-MCNC: 0.8 MG/DL (ref 0–1.2)
BUN SERPL-MCNC: 29 MG/DL (ref 8–27)
BUN/CREAT SERPL: 20 (ref 12–28)
CALCIUM SERPL-MCNC: 9.4 MG/DL (ref 8.7–10.3)
CHLORIDE SERPL-SCNC: 102 MMOL/L (ref 96–106)
CHOLEST SERPL-MCNC: 199 MG/DL (ref 100–199)
CO2 SERPL-SCNC: 27 MMOL/L (ref 20–29)
CREAT SERPL-MCNC: 1.48 MG/DL (ref 0.57–1)
EGFRCR SERPLBLD CKD-EPI 2021: 36 ML/MIN/1.73
GLOBULIN SER CALC-MCNC: 2.4 G/DL (ref 1.5–4.5)
GLUCOSE SERPL-MCNC: 214 MG/DL (ref 65–99)
HBA1C MFR BLD: 7.5 % (ref 4.8–5.6)
HDLC SERPL-MCNC: 57 MG/DL
IMP & REVIEW OF LAB RESULTS: NORMAL
LDLC SERPL CALC-MCNC: 125 MG/DL (ref 0–99)
POTASSIUM SERPL-SCNC: 5.2 MMOL/L (ref 3.5–5.2)
PROT SERPL-MCNC: 6.3 G/DL (ref 6–8.5)
REPORT: NORMAL
SODIUM SERPL-SCNC: 143 MMOL/L (ref 134–144)
TRIGL SERPL-MCNC: 95 MG/DL (ref 0–149)
TSH SERPL DL<=0.005 MIU/L-ACNC: 0.58 UIU/ML (ref 0.45–4.5)
VLDLC SERPL CALC-MCNC: 17 MG/DL (ref 5–40)

## 2022-05-04 RX ORDER — METOPROLOL TARTRATE 50 MG/1
50 TABLET, FILM COATED ORAL DAILY
Qty: 90 TABLET | Refills: 1 | Status: SHIPPED | OUTPATIENT
Start: 2022-05-04

## 2022-05-16 NOTE — PROGRESS NOTES
Subjective   Rachel Albrecht is a 77 y.o. female.      F/u for dm 2, hypothyroidism, hyperlipidemia, hypertension, vitamin d def/ testing bs 2 x day  Last dm eye exam 5/6/21with dr Dias / last dm foot exam 3/14/22 with dr Coreas         She has known diabetes mellitus since age 43 and started on insulin more than 10 years ago.  She was on Lantus 23 units every morning, Januvia 50 mg/day, and pioglitazone 45 mg once a day until February 2022 when she was seen in emergency room for hypoglycemia in the morning..    She is on Lantus 16 units every morning, Starlix 60 mg 1/2 tablet at supper and Januvia 50 mg/day.  She was taken off Actos in February 2022.  She checks her blood sugar twice a day.  Fasting glucose 100-166.  3-hour postprandial glucose .  Hemoglobin A1c done in May 2022 is 7.5%.     She has proliferative diabetic retinopathy with macular edema.  She is receiving intraocular injections and last saw Dr. Lazarus in 4/22.  She denies numbness, tingling or burning in her hands or feet.    Urine microalbumin was elevated in November 2021.  She has chronic kidney disease with an EGFR of 33.     She has hyperlipidemia and is on simvastatin 20 mg/day.  She denies myalgia.  Lipid panel done in May 2022 are as follows: Cholesterol 199.  HDL 57.  .  Triglycerides 95.     She has hypothyroidism and is on levothyroxine 88 mcg/day.  She has no history of goiter, thyroid surgery or radiation therapy.      She has hypertension and is on metoprolol tartrate 50 mg once a day and an unknown water pill.    She has pulmonary hypertension and was evaluated by Dr. Rosario and Dr. Jj.   Echocardiogram done in December 2021 showed ejection fraction of 60 to 65%.  Mildly enlarged right ventricular cavity.  Mild mitral regurgitation.  Moderate tricuspid regurgitation.  Severe pulmonary hypertension.  She had cardiac catheterization and pulmonary function tests in December 2021.  She has no history of heart attack or  "stroke.  She denies chest pain or shortness of breath.     She has sleep apnea and is sleeping well with the CPAP.  She wakes up rested.     She has hyperuricemia and is on allopurinol 100 mg once a day.  She denies history of gout or kidney stones.    She has history of vitamin D deficiency and is on vitamin D3 2000 units/day.  25 hydroxy vitamin D done in May 2022 is normal at 54.8 ng/mL.    She has history of osteopenia and has not had follow-up bone density for many years.      The following portions of the patient's history were reviewed and updated as appropriate: allergies, current medications, past family history, past medical history, past social history, past surgical history and problem list.    Review of Systems   Respiratory: Negative for shortness of breath.    Cardiovascular: Negative for chest pain and palpitations.   Gastrointestinal: Negative.    Genitourinary: Negative.    Musculoskeletal: Negative for arthralgias and myalgias.   Neurological: Negative for numbness.     Vitals:    05/17/22 1011   BP: 137/62   Pulse: 52   Temp: 97.1 °F (36.2 °C)   SpO2: 98%   Weight: 81.1 kg (178 lb 12.8 oz)   Height: 162.6 cm (64\")      Objective   Physical Exam  Constitutional:       General: She is not in acute distress.     Appearance: Normal appearance. She is not ill-appearing, toxic-appearing or diaphoretic.   Eyes:      General: No scleral icterus.        Right eye: No discharge.         Left eye: No discharge.      Extraocular Movements: Extraocular movements intact.      Conjunctiva/sclera: Conjunctivae normal.   Neck:      Vascular: No carotid bruit.   Cardiovascular:      Rate and Rhythm: Normal rate and regular rhythm.      Heart sounds: Normal heart sounds. No murmur heard.    No friction rub.   Pulmonary:      Effort: Pulmonary effort is normal.      Breath sounds: Normal breath sounds.   Abdominal:      General: Bowel sounds are normal.      Palpations: Abdomen is soft.      Tenderness: There is no " right CVA tenderness or left CVA tenderness.   Musculoskeletal:         General: Normal range of motion.      Cervical back: Normal range of motion and neck supple. No rigidity or tenderness.   Lymphadenopathy:      Cervical: No cervical adenopathy.   Neurological:      General: No focal deficit present.      Mental Status: She is alert and oriented to person, place, and time.   Psychiatric:         Mood and Affect: Mood normal.         Behavior: Behavior normal.       Orders Only on 05/03/2022   Component Date Value Ref Range Status   • Glucose 05/03/2022 214 (A) 65 - 99 mg/dL Final   • BUN 05/03/2022 29 (A) 8 - 27 mg/dL Final   • Creatinine 05/03/2022 1.48 (A) 0.57 - 1.00 mg/dL Final   • EGFR Result 05/03/2022 36 (A) >59 mL/min/1.73 Final   • BUN/Creatinine Ratio 05/03/2022 20  12 - 28 Final   • Sodium 05/03/2022 143  134 - 144 mmol/L Final   • Potassium 05/03/2022 5.2  3.5 - 5.2 mmol/L Final   • Chloride 05/03/2022 102  96 - 106 mmol/L Final   • Total CO2 05/03/2022 27  20 - 29 mmol/L Final   • Calcium 05/03/2022 9.4  8.7 - 10.3 mg/dL Final   • Total Protein 05/03/2022 6.3  6.0 - 8.5 g/dL Final   • Albumin 05/03/2022 3.9  3.7 - 4.7 g/dL Final   • Globulin 05/03/2022 2.4  1.5 - 4.5 g/dL Final   • A/G Ratio 05/03/2022 1.6  1.2 - 2.2 Final   • Total Bilirubin 05/03/2022 0.8  0.0 - 1.2 mg/dL Final   • Alkaline Phosphatase 05/03/2022 109  44 - 121 IU/L Final   • AST (SGOT) 05/03/2022 24  0 - 40 IU/L Final   • ALT (SGPT) 05/03/2022 11  0 - 32 IU/L Final   • Total Cholesterol 05/03/2022 199  100 - 199 mg/dL Final   • Triglycerides 05/03/2022 95  0 - 149 mg/dL Final   • HDL Cholesterol 05/03/2022 57  >39 mg/dL Final   • VLDL Cholesterol Max 05/03/2022 17  5 - 40 mg/dL Final   • LDL Chol Calc ([facility]) 05/03/2022 125 (A) 0 - 99 mg/dL Final   • Interpretation 05/03/2022 Note   Final    Supplemental report is available.   • Interpretation 05/03/2022 Note   Final    Comment: Medical Director's Note: Effective 2/28/2022, [facility]  uses  the 2021 CKD-EPI creatinine equation without a race factor  to calculate and report eGFR results. eGFR results reported  prior to this date using the 2009 CKD-EPI equation are no  longer included in this report interpretation.  -------------------------------  CHRONIC KIDNEY DISEASE:  EGFR, BLOOD PRESSURE, AND PROTEINURIA ASSESSMENT  We presume eGFR has been less than 60 mL/min/1.73mE2 on at  least two occasions spaced at least 3 months apart. Current  eGFR is 36 mL/min/1.73mE2 corresponding to CKD stage 3b.  Potassium is within goal and has not changed significantly,  was 5.2 and now is 5.2 mmol/L. Glycemic control (HB A1c: 7.5  risk for hypoglycemia.  EGFR, BLOOD PRESSURE, AND PROTEINURIA TREATMENT SUGGESTIONS  -  Guidelines recommend a target blood pressure of 120/80 mmHg  or less in CKD patients to reduce cardiovascular risk and  CKD progression. Assessment of albuminuria (urine  albumin:creatinine ratio or urine protein:c                           reatinine ratio  preferred) is recommended at least annually in CKD patients  for staging and disease prognosis.  EGFR, BLOOD PRESSURE, AND PROTEINURIA FOLLOW-UP  -  fasting Renal Panel within 6 months; Spot Urine Panel  (Albumin preferred) is recommended by guidelines, at least  yearly; Hemoglobin A1C within 3 months;  BONE and MINERAL ASSESSMENT  Calcium is within goal and has not changed significantly,  was 9.4 and now is 9.4 mg/dL. Carbon Dioxide is within goal  and has not changed significantly, was 25 and now is 27  mmol/L. Vitamin D is adequate (was 49.9 and now is 54.8  ng/mL).  BONE and MINERAL TREATMENT SUGGESTIONS  -  Interpretations require simultaneous measurements of serum  calcium and phosphorus.  BONE and MINERAL FOLLOW-UP  -  25-Hydroxy Vitamin D within 12 months; fasting PTH with  Renal Panel is recommended by guidelines, at least yearly;  LIPIDS ASSESSMENT  Blood was non-fasting; interpret these results with caution.  LDL-C is borderline high  and has risen, was 100 a                           nd now is  125 mg/dL. Triglyceride is normal and has not changed  significantly, was 96 and now is 95 mg/dL. Non-HDL  Cholesterol is borderline high and has risen, was 118 and  now is 142 mg/dL. HDL-C is normal and has risen, was 46 and  now is 57 mg/dL.  LIPIDS TREATMENT SUGGESTIONS  -  Therapeutic lifestyle changes are always valuable to  maintain optimal blood lipid status (diet, exercise, weight  management). Begin statin. If statin already in use,  consider increasing dose to achieve at least a 50% LDL  reduction from baseline. Moderate or high intensity statin  is preferred. If statin cannot be tolerated or increased,  alternatives include use of an intestinal agent (ezetimibe  or bile acid sequestrant) or niacin.  LIPIDS FOLLOW-UP  -  fasting Lipid Panel within 3 months;  ANEMIA ASSESSMENT  Most recent order does not include a CBC Panel or iron  studies.  ANEMIA FOLLOW-UP  -  CBC is recommended by guidelines, at least yearly;  -------------------------------  DISCLAIMER  These a                           ssessments and treatment suggestions are provided as  a convenience in support of the physician-patient  relationship and are not intended to replace the physician's  clinical judgment. They are derived from national guidelines  in addition to other evidence and expert opinion. The  clinician should consider this information within the  context of clinical opinion and the individual patient.  SEE GUIDANCE FOR CHRONIC KIDNEY DISEASE PROGRAM: Kidney  Disease Improving Global Outcomes (KDIGO) clinical practice  guidelines are at http://kdigo.org/home/guidelines/.  National Kidney Foundation Kidney Disease Outcomes Quality  Initiative (KDOQI (TM)), with its limitations and  disclaimers, are at www.kidney.org/professionals/KDOQI. This  program is intended for patients who have been diagnosed  with stages 3, 4, or pre-dialysis 5 CKD. It is not intended  for  children, pregnant patients, or transplant patients.     • Hemoglobin A1C 05/03/2022 7.5 (A) 4.8 - 5.6 % Final    Comment:          Prediabetes: 5.7 - 6.4           Diabetes: >6.4           Glycemic control for adults with diabetes: <7.0     • TSH 05/03/2022 0.581  0.450 - 4.500 uIU/mL Final   • 25 Hydroxy, Vitamin D 05/03/2022 54.8  30.0 - 100.0 ng/mL Final    Comment: Vitamin D deficiency has been defined by the [place] of  Medicine and an Endocrine Society practice guideline as a  level of serum 25-OH vitamin D less than 20 ng/mL (1,2).  The Endocrine Society went on to further define vitamin D  insufficiency as a level between 21 and 29 ng/mL (2).  1. IOM ([place] of [employer]). 2010. Dietary reference     intakes for calcium and D. [place]: The     National Academies Press.  2. Homero MF, Maura NC, Jacki ANGELES, et al.     Evaluation, treatment, and prevention of vitamin D     deficiency: an Endocrine Society clinical practice     guideline. JCEM. 2011 Jul; 96(7):1911-30.     • Jethro Arredondo CMP14 Default 05/03/2022 Comment   Final    Comment: A hand-written panel/profile was received from your office. In  accordance with the [facility] Ambiguous Test Code Policy dated July 2003, we have completed your order by using the closest currently  or formerly recognized AMA panel.  We have assigned Comprehensive  Metabolic Panel (14), Test Code #[id number] to this request.  If this  is not the testing you wished to receive on this specimen, please  contact the [facility] Client Inquiry/Technical Services Department  to clarify the test order.  We appreciate your business.     • Jethro Arredondo LP Default 05/03/2022 Comment   Final    Comment: A hand-written panel/profile was received from your office. In  accordance with the [facility] Ambiguous Test Code Policy dated July 2003, we have completed your order by using the closest currently  or formerly recognized AMA panel.  We have assigned Lipid Panel,  Test Code  #[id number] to this request. If this is not the testing you  wished to receive on this specimen, please contact the [facility]  Client Inquiry/Technical Services Department to clarify the test  order.  We appreciate your business.       Assessment & Plan   Diagnoses and all orders for this visit:    1. Type 2 diabetes mellitus with complication, with long-term current use of insulin (HCC) (Primary)  -     Insulin Pen Needle (B-D ULTRAFINE III SHORT PEN) 31G X 8 MM misc; Using 1 pen needle daily  Dispense: 100 each; Refill: 3    2. Vitamin D deficiency  -     Insulin Pen Needle (B-D ULTRAFINE III SHORT PEN) 31G X 8 MM misc; Using 1 pen needle daily  Dispense: 100 each; Refill: 3  -     SITagliptin (Januvia) 50 MG tablet; Take 1 tablet by mouth Daily.  Dispense: 90 tablet; Refill: 1    3. Hyperuricemia    4. Obstructive sleep apnea syndrome    5. Primary hypothyroidism    6. Type 2 diabetes mellitus with complications (HCC)    7. Type 2 diabetes mellitus with nephropathy (HCC)    8. Other hyperlipidemia    9. Renal insufficiency  -     Insulin Pen Needle (B-D ULTRAFINE III SHORT PEN) 31G X 8 MM misc; Using 1 pen needle daily  Dispense: 100 each; Refill: 3  -     SITagliptin (Januvia) 50 MG tablet; Take 1 tablet by mouth Daily.  Dispense: 90 tablet; Refill: 1    10. Dyslipidemia  -     Insulin Pen Needle (B-D ULTRAFINE III SHORT PEN) 31G X 8 MM misc; Using 1 pen needle daily  Dispense: 100 each; Refill: 3  -     SITagliptin (Januvia) 50 MG tablet; Take 1 tablet by mouth Daily.  Dispense: 90 tablet; Refill: 1    Other orders  -     nateglinide (Starlix) 60 MG tablet; 1 tablet at supper  Dispense: 90 tablet; Refill: 1  -     allopurinol (ZYLOPRIM) 100 MG tablet; Take 1 tablet by mouth Daily.  Dispense: 90 tablet; Refill: 1  -     Insulin Glargine (Lantus SoloStar) 100 UNIT/ML injection pen; 18 units every morning  Dispense: 10 pen; Refill: 1  -     levothyroxine (SYNTHROID, LEVOTHROID) 88 MCG tablet; Take 1 tablet by mouth  Daily.  Dispense: 90 tablet; Refill: 1  -     simvastatin (ZOCOR) 20 MG tablet; Take 1 tablet by mouth Every Night.  Dispense: 90 tablet; Refill: 1  -     OneTouch Delica Lancets 33G misc; Use twice daily  Dispense: 200 each; Refill: 3      Increase Lantus to 18 units every morning.  Increase Starlix to 60 mg 1 tablet at supper.  Continue Januvia 50 mg once a day.  Follow-up with ophthalmologist as scheduled.  Continue simvastatin 20 mg/day.  Continue levothyroxine 88 mcg/day..  Will defer blood pressure control to cardiologist Dr. Rosario.  Continue allopurinol 100 mg a day.  Continue CPAP.  Continue vitamin D3 2000 units once a day.  Advised to discuss with Dr. Rivas about getting a follow-up bone density.    Copy of my note sent to Dr. Rivas.    RTC 3 mos with KATHERYN Brenner NP.

## 2022-05-17 ENCOUNTER — OFFICE VISIT (OUTPATIENT)
Dept: ENDOCRINOLOGY | Age: 77
End: 2022-05-17

## 2022-05-17 VITALS
HEART RATE: 52 BPM | HEIGHT: 64 IN | SYSTOLIC BLOOD PRESSURE: 137 MMHG | TEMPERATURE: 97.1 F | WEIGHT: 178.8 LBS | BODY MASS INDEX: 30.52 KG/M2 | OXYGEN SATURATION: 98 % | DIASTOLIC BLOOD PRESSURE: 62 MMHG

## 2022-05-17 DIAGNOSIS — E11.8 TYPE 2 DIABETES MELLITUS WITH COMPLICATION, WITH LONG-TERM CURRENT USE OF INSULIN: Primary | ICD-10-CM

## 2022-05-17 DIAGNOSIS — E55.9 VITAMIN D DEFICIENCY: ICD-10-CM

## 2022-05-17 DIAGNOSIS — E03.9 PRIMARY HYPOTHYROIDISM: ICD-10-CM

## 2022-05-17 DIAGNOSIS — Z79.4 TYPE 2 DIABETES MELLITUS WITH COMPLICATION, WITH LONG-TERM CURRENT USE OF INSULIN: Primary | ICD-10-CM

## 2022-05-17 DIAGNOSIS — E11.21 TYPE 2 DIABETES MELLITUS WITH NEPHROPATHY: ICD-10-CM

## 2022-05-17 DIAGNOSIS — E78.5 DYSLIPIDEMIA: ICD-10-CM

## 2022-05-17 DIAGNOSIS — E11.8 TYPE 2 DIABETES MELLITUS WITH COMPLICATIONS: ICD-10-CM

## 2022-05-17 DIAGNOSIS — E79.0 HYPERURICEMIA: ICD-10-CM

## 2022-05-17 DIAGNOSIS — N28.9 RENAL INSUFFICIENCY: ICD-10-CM

## 2022-05-17 DIAGNOSIS — G47.33 OBSTRUCTIVE SLEEP APNEA SYNDROME: ICD-10-CM

## 2022-05-17 DIAGNOSIS — E78.49 OTHER HYPERLIPIDEMIA: ICD-10-CM

## 2022-05-17 PROCEDURE — 99214 OFFICE O/P EST MOD 30 MIN: CPT | Performed by: INTERNAL MEDICINE

## 2022-05-17 RX ORDER — NATEGLINIDE 60 MG/1
TABLET ORAL
Qty: 90 TABLET | Refills: 1 | Status: SHIPPED | OUTPATIENT
Start: 2022-05-17 | End: 2023-01-09

## 2022-05-17 RX ORDER — SIMVASTATIN 20 MG
20 TABLET ORAL NIGHTLY
Qty: 90 TABLET | Refills: 1 | Status: SHIPPED | OUTPATIENT
Start: 2022-05-17

## 2022-05-17 RX ORDER — PEN NEEDLE, DIABETIC 31 GX5/16"
NEEDLE, DISPOSABLE MISCELLANEOUS
Qty: 100 EACH | Refills: 3 | Status: SHIPPED | OUTPATIENT
Start: 2022-05-17

## 2022-05-17 RX ORDER — LEVOTHYROXINE SODIUM 88 UG/1
88 TABLET ORAL DAILY
Qty: 90 TABLET | Refills: 1 | Status: SHIPPED | OUTPATIENT
Start: 2022-05-17 | End: 2022-12-05

## 2022-05-17 RX ORDER — ALLOPURINOL 100 MG/1
100 TABLET ORAL DAILY
Qty: 90 TABLET | Refills: 1 | Status: SHIPPED | OUTPATIENT
Start: 2022-05-17

## 2022-05-17 RX ORDER — LANCETS 33 GAUGE
EACH MISCELLANEOUS
Qty: 200 EACH | Refills: 3 | Status: SHIPPED | OUTPATIENT
Start: 2022-05-17

## 2022-05-17 RX ORDER — INSULIN GLARGINE 100 [IU]/ML
INJECTION, SOLUTION SUBCUTANEOUS
Qty: 10 PEN | Refills: 1
Start: 2022-05-17 | End: 2022-06-28 | Stop reason: SDUPTHER

## 2022-06-28 RX ORDER — INSULIN GLARGINE 100 [IU]/ML
20 INJECTION, SOLUTION SUBCUTANEOUS
Qty: 6 PEN | Refills: 1
Start: 2022-06-28 | End: 2022-07-12 | Stop reason: SDUPTHER

## 2022-06-30 DIAGNOSIS — R80.9 PROTEINURIA, UNSPECIFIED TYPE: Primary | ICD-10-CM

## 2022-07-12 ENCOUNTER — TELEPHONE (OUTPATIENT)
Dept: ENDOCRINOLOGY | Age: 77
End: 2022-07-12

## 2022-07-12 RX ORDER — INSULIN GLARGINE 100 [IU]/ML
20 INJECTION, SOLUTION SUBCUTANEOUS
Qty: 45 ML | Refills: 3 | Status: SHIPPED | OUTPATIENT
Start: 2022-07-12 | End: 2022-07-15 | Stop reason: SDUPTHER

## 2022-07-12 NOTE — TELEPHONE ENCOUNTER
PATIENT CALLED LEFT MESSAGE WANTING SOMEONE TO GET BACK TO HER, SHE ORDER HER LANTUS FROM [employer] [facility] ON 6/23/2022, SHE STILL HASNT RECEIVED.     PLEASE CALL [facility] TO SEE WHAT THE PROBLEM MIGHT BE ON WHY IT HASNT BEEN SHIPPED.    CALL PATIENT BACK [phone]

## 2022-07-15 ENCOUNTER — TELEPHONE (OUTPATIENT)
Dept: ENDOCRINOLOGY | Age: 77
End: 2022-07-15

## 2022-07-15 RX ORDER — INSULIN GLARGINE 100 [IU]/ML
INJECTION, SOLUTION SUBCUTANEOUS
Qty: 45 ML | Refills: 3 | Status: SHIPPED | OUTPATIENT
Start: 2022-07-15

## 2022-12-05 RX ORDER — LEVOTHYROXINE SODIUM 88 UG/1
TABLET ORAL
Qty: 90 TABLET | Refills: 3 | Status: SHIPPED | OUTPATIENT
Start: 2022-12-05

## 2022-12-23 DIAGNOSIS — N28.9 RENAL INSUFFICIENCY: ICD-10-CM

## 2022-12-23 DIAGNOSIS — E55.9 VITAMIN D DEFICIENCY: ICD-10-CM

## 2022-12-23 DIAGNOSIS — E78.5 DYSLIPIDEMIA: ICD-10-CM

## 2022-12-23 NOTE — TELEPHONE ENCOUNTER
Rx Refill Note  Requested Prescriptions     Pending Prescriptions Disp Refills    Januvia 50 MG tablet [Pharmacy Med Name: JANUVIA TABS 50MG] 90 tablet 3     Sig: TAKE 1 TABLET DAILY      Last office visit with prescribing clinician: 5/17/2022   Last telemedicine visit with prescribing clinician: 7 months ago   Next office visit with prescribing clinician: Visit date not found                         Would you like a call back once the refill request has been completed: [] Yes [] No    If the office needs to give you a call back, can they leave a voicemail: [] Yes [] No    Lucy Naidu  12/23/22, 14:34 EST

## 2023-01-09 RX ORDER — NATEGLINIDE 60 MG/1
TABLET ORAL
Qty: 90 TABLET | Refills: 0 | Status: SHIPPED | OUTPATIENT
Start: 2023-01-09

## 2023-01-10 NOTE — TELEPHONE ENCOUNTER
Last refill for Starlix was given.  Please schedule follow-up visit with KATHERYN Brenner NP.  Patient canceled her appointment in August 2022 and did not reschedule.

## 2023-03-07 DIAGNOSIS — N28.9 RENAL INSUFFICIENCY: ICD-10-CM

## 2023-03-07 DIAGNOSIS — E55.9 VITAMIN D DEFICIENCY: ICD-10-CM

## 2023-03-07 DIAGNOSIS — E78.5 DYSLIPIDEMIA: ICD-10-CM

## 2023-03-08 RX ORDER — SITAGLIPTIN 50 MG/1
TABLET, FILM COATED ORAL
Qty: 90 TABLET | Refills: 3 | OUTPATIENT
Start: 2023-03-08

## 2023-03-08 NOTE — TELEPHONE ENCOUNTER
Rx Refill Note  Requested Prescriptions     Pending Prescriptions Disp Refills   • Januvia 50 MG tablet [Pharmacy Med Name: JANUVIA TABS 50MG] 90 tablet 3     Sig: TAKE 1 TABLET DAILY (LAST REFILL, PLEASE SCHEDULE FOLLOW UP VISIT)      Last office visit with prescribing clinician: 5/17/2022   Last telemedicine visit with prescribing clinician: Visit date not found   Next office visit with prescribing clinician: Visit date not found                         Would you like a call back once the refill request has been completed: [] Yes [] No    If the office needs to give you a call back, can they leave a voicemail: [] Yes [] No    Farrah Lane MA  03/08/23, 09:01 EST

## 2023-04-18 RX ORDER — NATEGLINIDE 60 MG/1
TABLET ORAL
Qty: 90 TABLET | Refills: 3 | OUTPATIENT
Start: 2023-04-18

## 2023-04-18 NOTE — TELEPHONE ENCOUNTER
Antihyperglycemics Protocol Failed 04/17/2023 06:27 PM   Protocol Details  HgA1c in past 6 months    Recent or future visit with authorizing provider     Last ov appt 5/17/22  No future appt

## 2023-04-18 NOTE — TELEPHONE ENCOUNTER
Refill for Starlix denied.  Patient was last seen in May 2022 and does not have a follow-up appointment.

## 2024-03-12 RX ORDER — LEVOTHYROXINE SODIUM 88 UG/1
TABLET ORAL
Qty: 90 TABLET | Refills: 3 | OUTPATIENT
Start: 2024-03-12

## 2024-03-12 NOTE — TELEPHONE ENCOUNTER
Rx Refill Note  Requested Prescriptions     Pending Prescriptions Disp Refills    levothyroxine (SYNTHROID, LEVOTHROID) 88 MCG tablet [Pharmacy Med Name: L-THYROXINE (SYNTHROID) TABS 88MCG] 90 tablet 3     Sig: TAKE 1 TABLET DAILY      Last office visit with prescribing clinician: 5/17/2022   Last telemedicine visit with prescribing clinician: Visit date not found   Next office visit with prescribing clinician: Visit date not found                         Would you like a call back once the refill request has been completed: [] Yes [] No    If the office needs to give you a call back, can they leave a voicemail: [] Yes [] No    Marlen Sandoval MA  03/12/24, 07:51 EDT